# Patient Record
Sex: FEMALE | Race: WHITE | NOT HISPANIC OR LATINO | URBAN - METROPOLITAN AREA
[De-identification: names, ages, dates, MRNs, and addresses within clinical notes are randomized per-mention and may not be internally consistent; named-entity substitution may affect disease eponyms.]

---

## 2017-04-26 ENCOUNTER — APPOINTMENT (RX ONLY)
Dept: URBAN - METROPOLITAN AREA CLINIC 24 | Facility: CLINIC | Age: 42
Setting detail: DERMATOLOGY
End: 2017-04-26

## 2017-04-26 DIAGNOSIS — D22 MELANOCYTIC NEVI: ICD-10-CM

## 2017-04-26 DIAGNOSIS — L81.4 OTHER MELANIN HYPERPIGMENTATION: ICD-10-CM

## 2017-04-26 DIAGNOSIS — L82.1 OTHER SEBORRHEIC KERATOSIS: ICD-10-CM

## 2017-04-26 DIAGNOSIS — Z71.89 OTHER SPECIFIED COUNSELING: ICD-10-CM

## 2017-04-26 DIAGNOSIS — Z87.2 PERSONAL HISTORY OF DISEASES OF THE SKIN AND SUBCUTANEOUS TISSUE: ICD-10-CM

## 2017-04-26 DIAGNOSIS — L81.5 LEUKODERMA, NOT ELSEWHERE CLASSIFIED: ICD-10-CM

## 2017-04-26 PROBLEM — D22.5 MELANOCYTIC NEVI OF TRUNK: Status: ACTIVE | Noted: 2017-04-26

## 2017-04-26 PROBLEM — D22.71 MELANOCYTIC NEVI OF RIGHT LOWER LIMB, INCLUDING HIP: Status: ACTIVE | Noted: 2017-04-26

## 2017-04-26 PROBLEM — F41.9 ANXIETY DISORDER, UNSPECIFIED: Status: ACTIVE | Noted: 2017-04-26

## 2017-04-26 PROBLEM — E03.9 HYPOTHYROIDISM, UNSPECIFIED: Status: ACTIVE | Noted: 2017-04-26

## 2017-04-26 PROBLEM — J30.1 ALLERGIC RHINITIS DUE TO POLLEN: Status: ACTIVE | Noted: 2017-04-26

## 2017-04-26 PROBLEM — D22.4 MELANOCYTIC NEVI OF SCALP AND NECK: Status: ACTIVE | Noted: 2017-04-26

## 2017-04-26 PROBLEM — D22.72 MELANOCYTIC NEVI OF LEFT LOWER LIMB, INCLUDING HIP: Status: ACTIVE | Noted: 2017-04-26

## 2017-04-26 PROCEDURE — ? COUNSELING

## 2017-04-26 PROCEDURE — 99213 OFFICE O/P EST LOW 20 MIN: CPT

## 2017-04-26 ASSESSMENT — LOCATION DETAILED DESCRIPTION DERM
LOCATION DETAILED: RIGHT INFERIOR ANTERIOR NECK
LOCATION DETAILED: LEFT DISTAL DORSAL FOREARM
LOCATION DETAILED: LEFT SUPERIOR PARIETAL SCALP
LOCATION DETAILED: RIGHT PROXIMAL PRETIBIAL REGION
LOCATION DETAILED: INFERIOR MID FOREHEAD
LOCATION DETAILED: RIGHT PROXIMAL DORSAL FOREARM
LOCATION DETAILED: RIGHT SUPERIOR MEDIAL LOWER BACK
LOCATION DETAILED: LEFT PROXIMAL PRETIBIAL REGION
LOCATION DETAILED: LEFT ANTERIOR DISTAL THIGH
LOCATION DETAILED: RIGHT SUPERIOR UPPER BACK
LOCATION DETAILED: SUPERIOR THORACIC SPINE
LOCATION DETAILED: LEFT ANTERIOR PROXIMAL THIGH
LOCATION DETAILED: LEFT SUPERIOR LATERAL LOWER BACK

## 2017-04-26 ASSESSMENT — LOCATION ZONE DERM
LOCATION ZONE: LEG
LOCATION ZONE: ARM
LOCATION ZONE: SCALP
LOCATION ZONE: FACE
LOCATION ZONE: TRUNK
LOCATION ZONE: NECK

## 2017-04-26 ASSESSMENT — LOCATION SIMPLE DESCRIPTION DERM
LOCATION SIMPLE: RIGHT PRETIBIAL REGION
LOCATION SIMPLE: LEFT FOREARM
LOCATION SIMPLE: SCALP
LOCATION SIMPLE: RIGHT UPPER BACK
LOCATION SIMPLE: LEFT THIGH
LOCATION SIMPLE: LEFT LOWER BACK
LOCATION SIMPLE: UPPER BACK
LOCATION SIMPLE: RIGHT FOREARM
LOCATION SIMPLE: RIGHT LOWER BACK
LOCATION SIMPLE: INFERIOR FOREHEAD
LOCATION SIMPLE: LEFT PRETIBIAL REGION
LOCATION SIMPLE: RIGHT ANTERIOR NECK

## 2017-06-23 ENCOUNTER — HOSPITAL ENCOUNTER (OUTPATIENT)
Dept: MAMMOGRAPHY | Age: 42
Discharge: HOME | End: 2017-06-23
Attending: FAMILY MEDICINE
Payer: COMMERCIAL

## 2017-06-23 DIAGNOSIS — N63.20 LEFT BREAST MASS: ICD-10-CM

## 2017-06-23 PROCEDURE — 76642 ULTRASOUND BREAST LIMITED: CPT

## 2017-06-23 PROCEDURE — 77065 DX MAMMO INCL CAD UNI: CPT

## 2018-05-30 ENCOUNTER — HOSPITAL ENCOUNTER (OUTPATIENT)
Dept: ULTRASOUND IMAGING | Age: 43
Discharge: HOME | End: 2018-05-30
Attending: FAMILY MEDICINE
Payer: COMMERCIAL

## 2018-05-30 DIAGNOSIS — E03.9 ACQUIRED HYPOTHYROIDISM: ICD-10-CM

## 2018-05-30 PROCEDURE — 76536 US EXAM OF HEAD AND NECK: CPT

## 2018-06-18 PROBLEM — Z80.8 FAMILY HISTORY OF THYROID CANCER: Status: ACTIVE | Noted: 2018-06-18

## 2018-06-18 PROBLEM — E04.9 GOITER: Status: ACTIVE | Noted: 2018-06-18

## 2019-01-22 ENCOUNTER — HOSPITAL ENCOUNTER (OUTPATIENT)
Dept: MRI IMAGING | Age: 44
Discharge: HOME | End: 2019-01-22
Attending: FAMILY MEDICINE
Payer: COMMERCIAL

## 2019-01-22 DIAGNOSIS — R29.2 HYPERREFLEXIC: ICD-10-CM

## 2019-01-22 DIAGNOSIS — R20.2 BILATERAL LEG PARESTHESIA: ICD-10-CM

## 2019-01-22 PROCEDURE — 72148 MRI LUMBAR SPINE W/O DYE: CPT

## 2019-01-24 PROBLEM — R20.2 PARESTHESIA OF BOTH LEGS: Status: ACTIVE | Noted: 2019-01-24

## 2019-01-24 PROBLEM — R29.2 HYPERREFLEXIA: Status: ACTIVE | Noted: 2019-01-24

## 2019-01-24 PROBLEM — M79.604 PAIN IN BOTH LOWER EXTREMITIES: Status: ACTIVE | Noted: 2019-01-24

## 2019-01-24 PROBLEM — M79.605 PAIN IN BOTH LOWER EXTREMITIES: Status: ACTIVE | Noted: 2019-01-24

## 2019-01-26 ENCOUNTER — HOSPITAL ENCOUNTER (INPATIENT)
Age: 44
LOS: 5 days | Discharge: HOME | DRG: 060 | End: 2019-01-31
Attending: EMERGENCY MEDICINE | Admitting: HOSPITALIST
Payer: COMMERCIAL

## 2019-01-26 ENCOUNTER — HOSPITAL ENCOUNTER (OUTPATIENT)
Dept: MRI IMAGING | Age: 44
Discharge: HOME | DRG: 060 | End: 2019-01-26
Attending: PSYCHIATRY & NEUROLOGY
Payer: COMMERCIAL

## 2019-01-26 DIAGNOSIS — R20.2 PARESTHESIA OF BOTH LEGS: ICD-10-CM

## 2019-01-26 DIAGNOSIS — R20.0 NUMBNESS: Primary | ICD-10-CM

## 2019-01-26 DIAGNOSIS — R20.0 EXTREMITY NUMBNESS: ICD-10-CM

## 2019-01-26 PROBLEM — G35 MULTIPLE SCLEROSIS EXACERBATION (HCC): Status: ACTIVE | Noted: 2019-01-26

## 2019-01-26 LAB
ALBUMIN SERPL-MCNC: 3.9 G/DL (ref 3.5–5)
ALBUMIN/GLOB SERPL: 1.2 {RATIO}
ALP SERPL-CCNC: 62 U/L (ref 50–136)
ALT SERPL-CCNC: 20 U/L (ref 12–65)
ANION GAP SERPL CALC-SCNC: 6 MMOL/L
AST SERPL-CCNC: 13 U/L (ref 15–37)
BASOPHILS # BLD: 0 K/UL (ref 0–0.2)
BASOPHILS NFR BLD: 0 % (ref 0–2)
BILIRUB SERPL-MCNC: 0.6 MG/DL (ref 0.2–1.1)
BUN SERPL-MCNC: 10 MG/DL (ref 6–23)
CALCIUM SERPL-MCNC: 8.8 MG/DL (ref 8.3–10.4)
CHLORIDE SERPL-SCNC: 102 MMOL/L (ref 98–107)
CO2 SERPL-SCNC: 28 MMOL/L (ref 21–32)
CREAT SERPL-MCNC: 0.83 MG/DL (ref 0.6–1)
DIFFERENTIAL METHOD BLD: ABNORMAL
EOSINOPHIL # BLD: 0 K/UL (ref 0–0.8)
EOSINOPHIL NFR BLD: 0 % (ref 0.5–7.8)
ERYTHROCYTE [DISTWIDTH] IN BLOOD BY AUTOMATED COUNT: 12.5 % (ref 11.9–14.6)
GLOBULIN SER CALC-MCNC: 3.3 G/DL (ref 2.3–3.5)
GLUCOSE SERPL-MCNC: 87 MG/DL (ref 65–100)
HCT VFR BLD AUTO: 38.6 % (ref 35.8–46.3)
HGB BLD-MCNC: 12.9 G/DL (ref 11.7–15.4)
IMM GRANULOCYTES # BLD AUTO: 0 K/UL (ref 0–0.5)
IMM GRANULOCYTES NFR BLD AUTO: 0 % (ref 0–5)
LYMPHOCYTES # BLD: 2.2 K/UL (ref 0.5–4.6)
LYMPHOCYTES NFR BLD: 23 % (ref 13–44)
MAGNESIUM SERPL-MCNC: 2.2 MG/DL (ref 1.8–2.4)
MCH RBC QN AUTO: 30.7 PG (ref 26.1–32.9)
MCHC RBC AUTO-ENTMCNC: 33.4 G/DL (ref 31.4–35)
MCV RBC AUTO: 91.9 FL (ref 79.6–97.8)
MONOCYTES # BLD: 0.7 K/UL (ref 0.1–1.3)
MONOCYTES NFR BLD: 8 % (ref 4–12)
NEUTS SEG # BLD: 6.5 K/UL (ref 1.7–8.2)
NEUTS SEG NFR BLD: 68 % (ref 43–78)
NRBC # BLD: 0 K/UL (ref 0–0.2)
PHOSPHATE SERPL-MCNC: 3.3 MG/DL (ref 2.5–4.5)
PLATELET # BLD AUTO: 275 K/UL (ref 150–450)
PMV BLD AUTO: 10.4 FL (ref 9.4–12.3)
POTASSIUM SERPL-SCNC: 3.6 MMOL/L (ref 3.5–5.1)
PROT SERPL-MCNC: 7.2 G/DL
RBC # BLD AUTO: 4.2 M/UL (ref 4.05–5.2)
SODIUM SERPL-SCNC: 136 MMOL/L (ref 136–145)
T4 FREE SERPL-MCNC: 1.1 NG/DL (ref 0.9–1.8)
TSH SERPL DL<=0.005 MIU/L-ACNC: 3.88 UIU/ML
WBC # BLD AUTO: 9.5 K/UL (ref 4.3–11.1)

## 2019-01-26 PROCEDURE — 83735 ASSAY OF MAGNESIUM: CPT

## 2019-01-26 PROCEDURE — 84439 ASSAY OF FREE THYROXINE: CPT

## 2019-01-26 PROCEDURE — 74011250636 HC RX REV CODE- 250/636: Performed by: EMERGENCY MEDICINE

## 2019-01-26 PROCEDURE — 74011000302 HC RX REV CODE- 302: Performed by: HOSPITALIST

## 2019-01-26 PROCEDURE — 65270000029 HC RM PRIVATE

## 2019-01-26 PROCEDURE — 84443 ASSAY THYROID STIM HORMONE: CPT

## 2019-01-26 PROCEDURE — 80053 COMPREHEN METABOLIC PANEL: CPT

## 2019-01-26 PROCEDURE — 74011250636 HC RX REV CODE- 250/636: Performed by: PSYCHIATRY & NEUROLOGY

## 2019-01-26 PROCEDURE — 74011250637 HC RX REV CODE- 250/637: Performed by: HOSPITALIST

## 2019-01-26 PROCEDURE — 84100 ASSAY OF PHOSPHORUS: CPT

## 2019-01-26 PROCEDURE — 86580 TB INTRADERMAL TEST: CPT | Performed by: HOSPITALIST

## 2019-01-26 PROCEDURE — 72156 MRI NECK SPINE W/O & W/DYE: CPT

## 2019-01-26 PROCEDURE — 85025 COMPLETE CBC W/AUTO DIFF WBC: CPT

## 2019-01-26 PROCEDURE — 93005 ELECTROCARDIOGRAM TRACING: CPT | Performed by: EMERGENCY MEDICINE

## 2019-01-26 PROCEDURE — 99284 EMERGENCY DEPT VISIT MOD MDM: CPT | Performed by: EMERGENCY MEDICINE

## 2019-01-26 PROCEDURE — A9575 INJ GADOTERATE MEGLUMI 0.1ML: HCPCS | Performed by: PSYCHIATRY & NEUROLOGY

## 2019-01-26 RX ORDER — SODIUM CHLORIDE 0.9 % (FLUSH) 0.9 %
5-40 SYRINGE (ML) INJECTION AS NEEDED
Status: DISCONTINUED | OUTPATIENT
Start: 2019-01-26 | End: 2019-01-28 | Stop reason: HOSPADM

## 2019-01-26 RX ORDER — ONDANSETRON 2 MG/ML
4 INJECTION INTRAMUSCULAR; INTRAVENOUS
Status: DISCONTINUED | OUTPATIENT
Start: 2019-01-26 | End: 2019-01-28 | Stop reason: HOSPADM

## 2019-01-26 RX ORDER — GABAPENTIN 100 MG/1
100 CAPSULE ORAL 3 TIMES DAILY
Status: DISCONTINUED | OUTPATIENT
Start: 2019-01-26 | End: 2019-01-28 | Stop reason: HOSPADM

## 2019-01-26 RX ORDER — SODIUM CHLORIDE 0.9 % (FLUSH) 0.9 %
5-40 SYRINGE (ML) INJECTION EVERY 8 HOURS
Status: DISCONTINUED | OUTPATIENT
Start: 2019-01-26 | End: 2019-01-28 | Stop reason: HOSPADM

## 2019-01-26 RX ORDER — LORAZEPAM 1 MG/1
1 TABLET ORAL
Status: DISCONTINUED | OUTPATIENT
Start: 2019-01-26 | End: 2019-01-28 | Stop reason: HOSPADM

## 2019-01-26 RX ORDER — ACETAMINOPHEN 325 MG/1
650 TABLET ORAL
Status: DISCONTINUED | OUTPATIENT
Start: 2019-01-26 | End: 2019-01-28 | Stop reason: HOSPADM

## 2019-01-26 RX ORDER — GADOTERATE MEGLUMINE 376.9 MG/ML
14 INJECTION INTRAVENOUS
Status: COMPLETED | OUTPATIENT
Start: 2019-01-26 | End: 2019-01-26

## 2019-01-26 RX ORDER — SERTRALINE HYDROCHLORIDE 50 MG/1
50 TABLET, FILM COATED ORAL DAILY
Status: DISCONTINUED | OUTPATIENT
Start: 2019-01-27 | End: 2019-01-28 | Stop reason: HOSPADM

## 2019-01-26 RX ORDER — SODIUM CHLORIDE 0.9 % (FLUSH) 0.9 %
10 SYRINGE (ML) INJECTION
Status: COMPLETED | OUTPATIENT
Start: 2019-01-26 | End: 2019-01-26

## 2019-01-26 RX ADMIN — GADOTERATE MEGLUMINE 14 ML: 376.9 INJECTION INTRAVENOUS at 12:35

## 2019-01-26 RX ADMIN — METHYLPREDNISOLONE SODIUM SUCCINATE 500 MG: 125 INJECTION, POWDER, FOR SOLUTION INTRAMUSCULAR; INTRAVENOUS at 21:41

## 2019-01-26 RX ADMIN — Medication 10 ML: at 23:33

## 2019-01-26 RX ADMIN — TUBERCULIN PURIFIED PROTEIN DERIVATIVE 5 UNITS: 5 INJECTION, SOLUTION INTRADERMAL at 23:30

## 2019-01-26 RX ADMIN — Medication 10 ML: at 12:35

## 2019-01-26 RX ADMIN — GABAPENTIN 100 MG: 100 CAPSULE ORAL at 23:29

## 2019-01-27 ENCOUNTER — APPOINTMENT (OUTPATIENT)
Dept: MRI IMAGING | Age: 44
DRG: 060 | End: 2019-01-27
Attending: HOSPITALIST
Payer: COMMERCIAL

## 2019-01-27 LAB
ANION GAP SERPL CALC-SCNC: 7 MMOL/L
BUN SERPL-MCNC: 10 MG/DL (ref 6–23)
CALCIUM SERPL-MCNC: 8.7 MG/DL (ref 8.3–10.4)
CHLORIDE SERPL-SCNC: 104 MMOL/L (ref 98–107)
CO2 SERPL-SCNC: 27 MMOL/L (ref 21–32)
CREAT SERPL-MCNC: 0.69 MG/DL (ref 0.6–1)
CRP SERPL HS-MCNC: 1.6 MG/L
ERYTHROCYTE [SEDIMENTATION RATE] IN BLOOD: 9 MM/HR (ref 0–20)
GLUCOSE SERPL-MCNC: 151 MG/DL (ref 65–100)
POTASSIUM SERPL-SCNC: 3.6 MMOL/L (ref 3.5–5.1)
SODIUM SERPL-SCNC: 138 MMOL/L (ref 136–145)
VIT B12 SERPL-MCNC: 349 PG/ML (ref 193–986)

## 2019-01-27 PROCEDURE — 74011250637 HC RX REV CODE- 250/637: Performed by: HOSPITALIST

## 2019-01-27 PROCEDURE — 77030032490 HC SLV COMPR SCD KNE COVD -B

## 2019-01-27 PROCEDURE — 72157 MRI CHEST SPINE W/O & W/DYE: CPT

## 2019-01-27 PROCEDURE — 94760 N-INVAS EAR/PLS OXIMETRY 1: CPT

## 2019-01-27 PROCEDURE — 80048 BASIC METABOLIC PNL TOTAL CA: CPT

## 2019-01-27 PROCEDURE — 74011000258 HC RX REV CODE- 258: Performed by: HOSPITALIST

## 2019-01-27 PROCEDURE — 86256 FLUORESCENT ANTIBODY TITER: CPT

## 2019-01-27 PROCEDURE — 82607 VITAMIN B-12: CPT

## 2019-01-27 PROCEDURE — 85652 RBC SED RATE AUTOMATED: CPT

## 2019-01-27 PROCEDURE — 36415 COLL VENOUS BLD VENIPUNCTURE: CPT

## 2019-01-27 PROCEDURE — A9575 INJ GADOTERATE MEGLUMI 0.1ML: HCPCS | Performed by: HOSPITALIST

## 2019-01-27 PROCEDURE — 86141 C-REACTIVE PROTEIN HS: CPT

## 2019-01-27 PROCEDURE — 74011250636 HC RX REV CODE- 250/636: Performed by: HOSPITALIST

## 2019-01-27 PROCEDURE — 70553 MRI BRAIN STEM W/O & W/DYE: CPT

## 2019-01-27 PROCEDURE — 65270000029 HC RM PRIVATE

## 2019-01-27 RX ORDER — SODIUM CHLORIDE 0.9 % (FLUSH) 0.9 %
10 SYRINGE (ML) INJECTION
Status: COMPLETED | OUTPATIENT
Start: 2019-01-27 | End: 2019-01-27

## 2019-01-27 RX ORDER — GADOTERATE MEGLUMINE 376.9 MG/ML
14 INJECTION INTRAVENOUS
Status: COMPLETED | OUTPATIENT
Start: 2019-01-27 | End: 2019-01-27

## 2019-01-27 RX ORDER — ENOXAPARIN SODIUM 100 MG/ML
40 INJECTION SUBCUTANEOUS EVERY 24 HOURS
Status: DISCONTINUED | OUTPATIENT
Start: 2019-01-27 | End: 2019-01-28 | Stop reason: HOSPADM

## 2019-01-27 RX ADMIN — ENOXAPARIN SODIUM 40 MG: 40 INJECTION SUBCUTANEOUS at 17:39

## 2019-01-27 RX ADMIN — LORAZEPAM 1 MG: 1 TABLET ORAL at 11:25

## 2019-01-27 RX ADMIN — SERTRALINE HYDROCHLORIDE 50 MG: 50 TABLET ORAL at 09:38

## 2019-01-27 RX ADMIN — GABAPENTIN 100 MG: 100 CAPSULE ORAL at 17:39

## 2019-01-27 RX ADMIN — GABAPENTIN 100 MG: 100 CAPSULE ORAL at 22:00

## 2019-01-27 RX ADMIN — SODIUM CHLORIDE 1000 MG: 900 INJECTION, SOLUTION INTRAVENOUS at 09:37

## 2019-01-27 RX ADMIN — Medication 10 ML: at 12:16

## 2019-01-27 RX ADMIN — GADOTERATE MEGLUMINE 14 ML: 376.9 INJECTION INTRAVENOUS at 12:16

## 2019-01-27 RX ADMIN — Medication 10 ML: at 14:00

## 2019-01-27 RX ADMIN — GABAPENTIN 100 MG: 100 CAPSULE ORAL at 09:38

## 2019-01-27 NOTE — PROGRESS NOTES
Patient A/O x 4 denies any pain or nausea, patient has been up in recliner, independent ambulatory moving lower extremities with no deficits. All Neuro Vascular checked are WDL as documented, only complaint is of numbness in RLE.

## 2019-01-27 NOTE — ED TRIAGE NOTES
Pt states that she has had numbness in both legs since Tuesday. Is able to walk and has control of her bladder and bowels. Numbness is spreading.  Had a MRI today and was instructed to come to the ED

## 2019-01-27 NOTE — ROUTINE PROCESS
TRANSFER - OUT REPORT: 
 
Verbal report given to [facility] on [address]  being transferred to [facility] for routine progression of care Report consisted of patients Situation, Background, Assessment and  
Recommendations(SBAR). Information from the following report(s) SBAR was reviewed with the receiving nurse. Lines:  
Peripheral IV 01/26/19 Left Antecubital (Active) Site Assessment Clean, dry, & intact 1/26/2019  8:57 PM  
Phlebitis Assessment 0 1/26/2019  8:57 PM  
Infiltration Assessment 0 1/26/2019  8:57 PM  
  
 
Opportunity for questions and clarification was provided. Patient transported with: 
 [employer]

## 2019-01-27 NOTE — H&P
Hospitalist H&P/Consult Note Admit Date:  2019  8:19 PM  
Name:  Victorino York Age:  37 y.o. 
:  1975 MRN:  [id number] PCP:  Dexter Nicholson DO Treatment Team: Attending Provider: Erma Iyer MD 
 
HPI:  
Patient is a 36 y/o female with hx of Hashimoto's thyroiditis and family hx MS (mother) who presents to ED with suspected new onset MS flare. On Monday she first noticed shooting flashes down both legs with numbness. Saw PCP on Tuesday who ordered lumbar spine MRI to r/o pinched nerve, disc abnormality. This was negative. She was then referred to Neurology Dr. Sol and patient had normal nerve conduction studies. She was noted to have significant hyperreflexia. TSH and free T4 were normal. Now has numbness and tingling ulnar part right hand. Had MRI cervical spine today and this shows a focal area of abnormal T2 prolongation within the C5 spinal cord with the differential to include MS, focal transverse myelitis, less likely tumor or neuromyelitis optica. Patient denies acute vision problems. No motor deficits or confusion. No fever or chills. No recent viral illness. She was given 500 mg IV solumedrol in ED and Hospitalist service consulted for admission. She has not had a lumbar puncture for CSF analysis. Did have shingles right buttock in December and was treated with valtrex. 10 systems reviewed and negative except as noted in HPI. No diabetes, HTN, cardiac disease. No seizure hx. No loss of bowel, bladder continence. No tremors Past Medical History:  
Diagnosis Date  Hashimoto's thyroiditis  HPV in female 2015  
 positive on PAP  Rheumatoid arthritis ([facility] [address] No past surgical history on file. Prior to Admission Medications Prescriptions Last Dose Informant Patient Reported? Taking? LORazepam (ATIVAN) 0.5 mg tablet   No No  
Sig: Take 30 min before procedure. Repeat in 30 min if needed cholecalciferol, vitamin D3, (VITAMIN D3) 2,000 unit tab   Yes No  
Sig: Take  by mouth.  
gabapentin (NEURONTIN) 100 mg capsule   No No  
Sig: Take 1 Cap by mouth three (3) times daily. predniSONE (DELTASONE) 20 mg tablet   No No  
Sig: Take 20 mg by mouth daily (with breakfast) for 5 days. sertraline (ZOLOFT) 50 mg tablet   No No  
Sig: Take 1 Tab by mouth daily. valACYclovir (VALTREX) 1 gram tablet   No No  
Sig: Take 1 Tab by mouth three (3) times daily for 7 days. Facility-Administered Medications: None No Known Allergies Social History Tobacco Use  Smoking status: Never Smoker  Smokeless tobacco: Never Used Substance Use Topics  Alcohol use: Yes Alcohol/week: 1.8 oz Types: 3 Standard drinks or equivalent per week Comment: occ Family History Problem Relation Age of Onset  MS Mother  Diabetes Father   
     pre-diabetes  Thyroid Cancer Father  Thyroid Disease Sister   
     hypothyroidism  Liver Disease Brother   
     autoimmune hepatitis, had liver transplant  Thyroid Disease Brother   
     hypothyroidism  Thyroid Disease Sister   
     hypothyroidism  Heart Disease Brother   
     tachycardia  Breast Cancer Paternal Aunt  Heart Disease Maternal Aunt  Thyroid Disease Maternal Aunt   
     hyperthyroidism  Thyroid Disease Maternal Grandmother   
     hypothyroidism  Thyroid Disease Cousin   
     hypothyroidism Immunization History Administered Date(s) Administered  Influenza Vaccine 10/25/2013, 10/24/2018  Influenza Vaccine Elenora Gemma) 10/14/2014, 10/28/2015, 12/06/2016 Objective:  
 
Patient Vitals for the past 24 hrs: 
 Temp Pulse Resp BP SpO2  
01/26/19 1956 98.1 °F (36.7 °C) 78 16 106/69 97 % Oxygen Therapy O2 Sat (%): 97 % (01/26/19 1956) O2 Device: Room air (01/26/19 1956) No intake or output data in the 24 hours ending 01/26/19 2140 Physical Exam: 
General:    Well nourished. Alert. Eyes:   Normal sclera. Extraocular movements intact. ENT:  Normocephalic, atraumatic. Moist mucous membranes CV:   RRR. No murmur, rub, or gallop. Lungs:  CTAB. No wheezing, rhonchi, or rales. Abdomen: Soft, nontender, nondistended. Bowel sounds normal.  
Extremities: Warm and dry. No cyanosis or edema. Neurologic: CN II-XII grossly intact. Decreased sensation ulnar surface right hand Decreased sensation bilateral legs. Hyperreflexic patellar reflex Skin:     No rashes or jaundice. No wounds. Psych:  Normal mood and affect. I reviewed the labs, imaging, EKGs, telemetry, and other studies done this admission. Data Review:  
Recent Results (from the past 24 hour(s)) CBC WITH AUTOMATED DIFF Collection Time: 01/26/19  8:59 PM  
Result Value Ref Range WBC 9.5 4.3 - 11.1 K/uL  
 RBC 4.20 4.05 - 5.2 M/uL  
 HGB 12.9 11.7 - 15.4 g/dL HCT 38.6 35.8 - 46.3 % MCV 91.9 79.6 - 97.8 FL  
 MCH 30.7 26.1 - 32.9 PG  
 MCHC 33.4 31.4 - 35.0 g/dL  
 RDW 12.5 11.9 - 14.6 % PLATELET 117 555 - 677 K/uL MPV 10.4 9.4 - 12.3 FL ABSOLUTE NRBC 0.00 0.0 - 0.2 K/uL  
 DF AUTOMATED NEUTROPHILS 68 43 - 78 % LYMPHOCYTES 23 13 - 44 % MONOCYTES 8 4.0 - 12.0 % EOSINOPHILS 0 (L) 0.5 - 7.8 % BASOPHILS 0 0.0 - 2.0 % IMMATURE GRANULOCYTES 0 0.0 - 5.0 %  
 ABS. NEUTROPHILS 6.5 1.7 - 8.2 K/UL  
 ABS. LYMPHOCYTES 2.2 0.5 - 4.6 K/UL  
 ABS. MONOCYTES 0.7 0.1 - 1.3 K/UL  
 ABS. EOSINOPHILS 0.0 0.0 - 0.8 K/UL  
 ABS. BASOPHILS 0.0 0.0 - 0.2 K/UL  
 ABS. IMM. GRANS. 0.0 0.0 - 0.5 K/UL METABOLIC PANEL, COMPREHENSIVE Collection Time: 01/26/19  8:59 PM  
Result Value Ref Range Sodium 136 136 - 145 mmol/L Potassium 3.6 3.5 - 5.1 mmol/L Chloride 102 98 - 107 mmol/L  
 CO2 28 21 - 32 mmol/L Anion gap 6 mmol/L Glucose 87 65 - 100 mg/dL BUN 10 6 - 23 MG/DL Creatinine 0.83 0.6 - 1.0 MG/DL  
 GFR est AA >60 >60 ml/min/1.73m2 GFR est non-AA >60 ml/min/1.73m2 Calcium 8.8 8.3 - 10.4 MG/DL Bilirubin, total 0.6 0.2 - 1.1 MG/DL  
 ALT (SGPT) 20 12 - 65 U/L  
 AST (SGOT) 13 (L) 15 - 37 U/L Alk. phosphatase 62 50 - 136 U/L Protein, total 7.2 g/dL Albumin 3.9 3.5 - 5.0 g/dL Globulin 3.3 2.3 - 3.5 g/dL A-G Ratio 1.2 MAGNESIUM Collection Time: 01/26/19  8:59 PM  
Result Value Ref Range Magnesium 2.2 1.8 - 2.4 mg/dL PHOSPHORUS Collection Time: 01/26/19  8:59 PM  
Result Value Ref Range Phosphorus 3.3 2.5 - 4.5 MG/DL  
TSH 3RD GENERATION Collection Time: 01/26/19  8:59 PM  
Result Value Ref Range TSH 3.880 uIU/mL Imaging Studies: CXR Results  (Last 48 hours) None CT Results  (Last 48 hours) None Assessment and Plan:  
 
Hospital Problems as of 1/26/2019 Date Reviewed: 1/25/2019 Codes Class Noted - Resolved POA * (Principal) [facility] sclerosis exacerb[facility] ([facility] ICD-10-CM: G35 
ICD-9-CM: 340  1/26/2019 - Present Yes Hyperreflexia ICD-10-CM: R29.2 ICD-9-CM: 796.1  1/24/2019 - Present Yes Paresthesia of both legs ICD-10-CM: R20.2 ICD-9-CM: 782.0  1/24/2019 - Present Yes Pain in both lower extremities ICD-10-CM: M79.604, M79.605 ICD-9-CM: 729.5  1/24/2019 - Present Yes Hashimoto's thyroiditis ICD-10-CM: E06.3 ICD-9-CM: 722. 2  Unknown - Present Yes PLAN: 
· Admit inpatient to medical bed · Continue high dose IV solumedrol 1000 mg daily · Will obtain MRI brain and thoracic spine w/wout contrast 
· She has already had lumbar and cervical spine MRI 
· Prn ativan before MRI and if becomes anxious from high dose steroids · Monitor glucose while on steroids · Check sed rate, CRP, myelin assoc glycoprotein Ab, B12 
· Neurology consultation. May need LP for CSF analysis · PT evaluation. Place PPD · SCDs for DVT prophylaxis · No anticoagulants as may require lumbar puncture · Discussed with patient's spouse at bedside Estimated LOS:  2 or more midnights Signed: 
Jodie Irizarry MD

## 2019-01-27 NOTE — PROGRESS NOTES
01/26/19 2300 Dual Skin Pressure Injury Assessment Dual Skin Pressure Injury Assessment WDL Second Care Provider (Based on Facility Policy) Andria Carias RN

## 2019-01-27 NOTE — PROGRESS NOTES
Care Management Interventions Current Support Network: Lives with Spouse Plan discussed with Pt/Family/Caregiver: Yes Discharge Location Discharge Placement: Home 38 y/o F hx of Hashimoto's thyroiditis adm 1/26 with suspected new onset MS flare. Has PCP and Neurologist.  Lives with spouse and sons. No needs at this time.

## 2019-01-27 NOTE — PROGRESS NOTES
Problem: Falls - Risk of 
Goal: *Absence of Falls Document [place] Rank Fall Risk and appropriate interventions in the flowsheet. Sensory deficit due to MS Outcome: Progressing Towards Goal 
Fall Risk Interventions: 
  
 
  
 
Medication Interventions: Evaluate medications/consider consulting pharmacy

## 2019-01-27 NOTE — ED PROVIDER NOTES
15-year-old female presents with progressive worsening numbness extending from her feet to her hands over the past week. She was seen by her primary care doctor and referred to neurology, Dr. Sol. She had an unremarkable lumbar spine MRI. She had normal nerve conduction studies, but was found to be significantly hyperreflexic so a cervical spine MRI was ordered. It showed Focal area of abnormal T2 prolongation within the C5 spinal cord. Differential diagnosis include an area of demyelinization such as multiple 
sclerosis as well as focal transverse myelitis, less likely tumor, or 
neuromyelitis optica. She had worsening symptoms tonight with progression of numbness to right hand and now somewhat to her left fifth finger. She denies headache, nausea, vomiting, chest pain or shortness breath, diarrhea or weakness. She was advised by neurology to come to the hospital for admission. Her mother has multiple sclerosis. Numbness Pertinent negatives include no shortness of breath, no chest pain, no vomiting, no confusion, no headaches and no nausea. Past Medical History:  
Diagnosis Date  Hashimoto's thyroiditis  HPV in female 12/2015  
 positive on PAP  Rheumatoid arthritis ([facility] [address] No past surgical history on file. Family History:  
Problem Relation Age of Onset  MS Mother  Diabetes Father   
     pre-diabetes  Thyroid Cancer Father  Thyroid Disease Sister   
     hypothyroidism  Liver Disease Brother   
     autoimmune hepatitis, had liver transplant  Thyroid Disease Brother   
     hypothyroidism  Thyroid Disease Sister   
     hypothyroidism  Heart Disease Brother   
     tachycardia  Breast Cancer Paternal Aunt  Heart Disease Maternal Aunt  Thyroid Disease Maternal Aunt   
     hyperthyroidism  Thyroid Disease Maternal Grandmother   
     hypothyroidism  Thyroid Disease Cousin   
     hypothyroidism Social History Socioeconomic History  Marital status:  Spouse name: Dylan Victor  Number of children: 3  
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  
  Partners: Male Birth control/protection: Surgical  
Other Topics Concern  Not on file Social History Narrative Chidren: Edda Phan (12/25/98), Kostas (2/21/06), Elliot (9/23/09).  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 Review of Systems Constitutional: Negative for chills and fever. HENT: Negative for hearing loss. Eyes: Negative for visual disturbance. Respiratory: Negative for cough and shortness of breath. Cardiovascular: Negative for chest pain and palpitations. Gastrointestinal: Negative for abdominal pain, diarrhea, nausea and vomiting. Musculoskeletal: Negative for back pain. Skin: Negative for rash. Neurological: Positive for numbness. Negative for weakness and headaches. Psychiatric/Behavioral: Negative for confusion. Vitals:  
 01/26/19 1956 BP: 106/69 Pulse: 78 Resp: 16 Temp: 98.1 °F (36.7 °C) SpO2: 97% Weight: 70.3 kg (155 lb) Height: 5' 4\" (1.626 m) Physical Exam  
Constitutional: She appears well-developed and well-nourished. HENT:  
Head: Normocephalic and atraumatic.   
Right Ear: External ear normal.  
 Left Ear: External ear normal.  
Nose: Nose normal.  
Mouth/Throat: Oropharynx is clear and moist.  
Eyes: Conjunctivae are normal. Pupils are equal, round, and reactive to light. Neck: Normal range of motion. Neck supple. Cardiovascular: Regular rhythm, normal heart sounds and intact distal pulses. Pulmonary/Chest: Effort normal and breath sounds normal. No respiratory distress. She has no wheezes. Abdominal: Soft. Bowel sounds are normal. She exhibits no distension. There is no tenderness. Musculoskeletal: Normal range of motion. She exhibits no edema. Neurological: She is alert. A sensory deficit is present. Reflex Scores: 
     Bicep reflexes are 4+ on the right side and 4+ on the left side. Patellar reflexes are 4+ on the right side and 4+ on the left side. Decreased sensation diffuse bilateral legs and ulnar aspect right hand and left fifth finger. Significant hyperreflexia Skin: Skin is warm and dry. Psychiatric: Judgment normal.  
Nursing note and vitals reviewed. MDM Number of Diagnoses or Management Options Diagnosis management comments: Parts of this document were created using dragon voice recognition software. The chart has been reviewed but errors may still be present. Likely MS, but will need admission for further evaluation and high-dose IV steroids. Hospitalist paged. Amount and/or Complexity of Data Reviewed Clinical lab tests: ordered Tests in the medicine section of CPT®: ordered Review and summarize past medical records: yes Independent visualization of images, tracings, or specimens: yes Procedures

## 2019-01-27 NOTE — PROGRESS NOTES
TRANSFER - IN REPORT: 
 
Verbal report received from RAMON Steen on Morales Welch  being received from ER for routine progression of care Report consisted of patients Situation, Background, Assessment and  
Recommendations(SBAR). Information from the following report(s) SBAR, Kardex, ED Summary, Procedure Summary, Intake/Output, MAR, Recent Results and Med Rec Status was reviewed with the receiving nurse. Opportunity for questions and clarification was provided. Assessment completed upon patients arrival to unit and care assumed.

## 2019-01-27 NOTE — PROGRESS NOTES
Hospitalist Progress Note Subjective:  
Daily Progress Note: 1/27/2019 6:36 PM 
 
Patient is a 36 y/o female with hx of Hashimoto's thyroiditis and mother with MS who presents to ED with suspected new onset MS flare. On Monday she first noticed shooting flashes down both legs with numbness (right then left) with progression up to her chest and started affecting her fingers, on by one. She was then referred to Neurology Dr. Shahzad Mejia and patient had normal nerve conduction studies. She was noted to have significant hyperreflexia. TSH and free T4 were normal.  Had MRI cervical spine which showed a focal area of abnormal T2 prolongation within the C5 spinal cord with the differential to include MS, focal transverse myelitis, less likely tumor or neuromyelitis optica. Patient denies acute vision problems. No motor deficits or confusion. No fever or chills. No recent viral illness. She was given 500 mg IV solumedrol in ED and admitted. MRI brain shows single punctate focus of T2 hyperintensity in right frontal centrum ovale. Pt sitting up in bed with small child at bedside. States she feels about the same but notes numbness not progressing like it did before. She denies any new symptoms. Tolerating steroids. Current Facility-Administered Medications Medication Dose Route Frequency  methylPREDNISolone (PF) (Solu-MEDROL) 1,000 mg in 0.9% sodium chloride 100 mL IVPB  1,000 mg IntraVENous DAILY  enoxaparin (LOVENOX) injection 40 mg  40 mg SubCUTAneous Q24H  
 sodium chloride (NS) flush 5-40 mL  5-40 mL IntraVENous Q8H  
 sodium chloride (NS) flush 5-40 mL  5-40 mL IntraVENous PRN  
 gabapentin (NEURONTIN) capsule 100 mg  100 mg Oral TID  sertraline (ZOLOFT) tablet 50 mg  50 mg Oral DAILY  sodium chloride (NS) flush 5-40 mL  5-40 mL IntraVENous Q8H  
 sodium chloride (NS) flush 5-40 mL  5-40 mL IntraVENous PRN  
 tuberculin injection 5 Units  5 Units IntraDERMal ONCE  
  acetaminophen (TYLENOL) tablet 650 mg  650 mg Oral Q4H PRN  
 ondansetron (ZOFRAN) injection 4 mg  4 mg IntraVENous Q4H PRN  
 LORazepam (ATIVAN) tablet 1 mg  1 mg Oral TID PRN Review of Systems A comprehensive review of systems was negative except for that written in the HPI. Objective:  
 
Visit Vitals /71 (BP 1 Location: Right arm, BP Patient Position: Post activity) Pulse 74 Temp 98.3 °F (36.8 °C) Resp 16 Ht 5' 4\" (1.626 m) Wt 70.3 kg (155 lb) SpO2 97% Breastfeeding? No  
BMI 26.61 kg/m² O2 Device: Room air Temp (24hrs), Av.1 °F (36.7 °C), Min:98 °F (36.7 °C), Max:98.3 °F (36.8 °C) No intake/output data recorded.  1901 -  0700 In: -  
Out: 457 [[id number]] General appearance: alert, cooperative, no distress, appears stated age Lungs: clear to auscultation bilaterally Heart: regular rate and rhythm, S1, S2 normal 
Abdomen: soft, non-tender. Bowel sounds normal. No masses,  no organomegaly Extremities: extremities normal, atraumatic, no cyanosis or edema Psych: A+OX3 Additional comments:I reviewed the patient's new clinical lab test results. MRI brain Data Review Recent Results (from the past 24 hour(s)) CBC WITH AUTOMATED DIFF Collection Time: 19  8:59 PM  
Result Value Ref Range WBC 9.5 4.3 - 11.1 K/uL  
 RBC 4.20 4.05 - 5.2 M/uL  
 HGB 12.9 11.7 - 15.4 g/dL HCT 38.6 35.8 - 46.3 % MCV 91.9 79.6 - 97.8 FL  
 MCH 30.7 26.1 - 32.9 PG  
 MCHC 33.4 31.4 - 35.0 g/dL  
 RDW 12.5 11.9 - 14.6 % PLATELET 409 537 - 467 K/uL MPV 10.4 9.4 - 12.3 FL ABSOLUTE NRBC 0.00 0.0 - 0.2 K/uL  
 DF AUTOMATED NEUTROPHILS 68 43 - 78 % LYMPHOCYTES 23 13 - 44 % MONOCYTES 8 4.0 - 12.0 % EOSINOPHILS 0 (L) 0.5 - 7.8 % BASOPHILS 0 0.0 - 2.0 % IMMATURE GRANULOCYTES 0 0.0 - 5.0 %  
 ABS. NEUTROPHILS 6.5 1.7 - 8.2 K/UL  
 ABS. LYMPHOCYTES 2.2 0.5 - 4.6 K/UL  
 ABS. MONOCYTES 0.7 0.1 - 1.3 K/UL ABS. EOSINOPHILS 0.0 0.0 - 0.8 K/UL  
 ABS. BASOPHILS 0.0 0.0 - 0.2 K/UL  
 ABS. IMM. GRANS. 0.0 0.0 - 0.5 K/UL METABOLIC PANEL, COMPREHENSIVE Collection Time: 01/26/19  8:59 PM  
Result Value Ref Range Sodium 136 136 - 145 mmol/L Potassium 3.6 3.5 - 5.1 mmol/L Chloride 102 98 - 107 mmol/L  
 CO2 28 21 - 32 mmol/L Anion gap 6 mmol/L Glucose 87 65 - 100 mg/dL BUN 10 6 - 23 MG/DL Creatinine 0.83 0.6 - 1.0 MG/DL  
 GFR est AA >60 >60 ml/min/1.73m2 GFR est non-AA >60 ml/min/1.73m2 Calcium 8.8 8.3 - 10.4 MG/DL Bilirubin, total 0.6 0.2 - 1.1 MG/DL  
 ALT (SGPT) 20 12 - 65 U/L  
 AST (SGOT) 13 (L) 15 - 37 U/L Alk. phosphatase 62 50 - 136 U/L Protein, total 7.2 g/dL Albumin 3.9 3.5 - 5.0 g/dL Globulin 3.3 2.3 - 3.5 g/dL A-G Ratio 1.2 MAGNESIUM Collection Time: 01/26/19  8:59 PM  
Result Value Ref Range Magnesium 2.2 1.8 - 2.4 mg/dL PHOSPHORUS Collection Time: 01/26/19  8:59 PM  
Result Value Ref Range Phosphorus 3.3 2.5 - 4.5 MG/DL  
TSH 3RD GENERATION Collection Time: 01/26/19  8:59 PM  
Result Value Ref Range TSH 3.880 uIU/mL T4, FREE Collection Time: 01/26/19  8:59 PM  
Result Value Ref Range T4, Free 1.1 0.9 - 1.8 NG/DL  
METABOLIC PANEL, BASIC Collection Time: 01/27/19  3:18 AM  
Result Value Ref Range Sodium 138 136 - 145 mmol/L Potassium 3.6 3.5 - 5.1 mmol/L Chloride 104 98 - 107 mmol/L  
 CO2 27 21 - 32 mmol/L Anion gap 7 mmol/L Glucose 151 (H) 65 - 100 mg/dL BUN 10 6 - 23 MG/DL Creatinine 0.69 0.6 - 1.0 MG/DL  
 GFR est AA >60 >60 ml/min/1.73m2 GFR est non-AA >60 ml/min/1.73m2 Calcium 8.7 8.3 - 10.4 MG/DL  
SED RATE, AUTOMATED Collection Time: 01/27/19  3:18 AM  
Result Value Ref Range Sed rate, automated 9 0 - 20 mm/hr CRP, HIGH SENSITIVITY Collection Time: 01/27/19  3:18 AM  
Result Value Ref Range CRP, High sensitivity 1.6 mg/L MYELIN ASSOC GLYCOPROTEIN AB, IGM  
 Collection Time: 01/27/19  3:18 AM  
Result Value Ref Range Anti-MAG Ab, IgM PENDING Titer VITAMIN B12 Collection Time: 01/27/19  3:18 AM  
Result Value Ref Range Vitamin B12 349 193 - 986 pg/mL Assessment/Plan:  
 
Principal Problem: 
  Multiple sclerosis exacerbation ([address] (1/26/2019) Active Problems: 
  Hashimoto's thyroiditis () Hyperreflexia (1/24/2019) Paresthesia of both legs (1/24/2019) Pain in both lower extremities (1/24/2019) Plan: D/w tele-neuro - see consult. Recommends IV steroids for 5 days. This is pt's 2nd dose. Since she was on Prednisone (20mg) about 4 days prior to admission, she will need a taper once she completes her bolus dosings. Get LP for CSF eval and MS panel. Get DUSTIN. Recent shingles - get varicella in CSF too. Needs neurology follow up and repeat cervical MRI follow up in 6 weeks. Care Plan discussed with: Patient/Family, Nurse and Consultant . - tele neuro Dr Kirti Joy. Total time spent with patient: 20 minutes. Signed By: Jj Coombs MD   
 January 27, 2019

## 2019-01-28 ENCOUNTER — HOSPITAL ENCOUNTER (OUTPATIENT)
Dept: INTERVENTIONAL RADIOLOGY/VASCULAR | Age: 44
Discharge: HOME | End: 2019-01-28
Attending: HOSPITALIST
Payer: COMMERCIAL

## 2019-01-28 VITALS
OXYGEN SATURATION: 96 % | WEIGHT: 155 LBS | HEART RATE: 76 BPM | DIASTOLIC BLOOD PRESSURE: 55 MMHG | BODY MASS INDEX: 26.46 KG/M2 | SYSTOLIC BLOOD PRESSURE: 103 MMHG | HEIGHT: 64 IN | TEMPERATURE: 98.1 F | RESPIRATION RATE: 16 BRPM

## 2019-01-28 LAB
APPEARANCE FLD: CLEAR
COLOR FLD: COLORLESS
NUC CELL # FLD: 7 /CU MM
PROT CSF-MCNC: 34 MG/DL (ref 15–45)
RBC # FLD: 1 /CU MM
SPECIMEN SOURCE FLD: NORMAL
TUBE # CSF: 1

## 2019-01-28 PROCEDURE — 74011250637 HC RX REV CODE- 250/637: Performed by: HOSPITALIST

## 2019-01-28 PROCEDURE — 74011250637 HC RX REV CODE- 250/637: Performed by: INTERNAL MEDICINE

## 2019-01-28 PROCEDURE — 77030003666 HC NDL SPINAL BD -A

## 2019-01-28 PROCEDURE — 87798 DETECT AGENT NOS DNA AMP: CPT

## 2019-01-28 PROCEDURE — 62270 DX LMBR SPI PNXR: CPT

## 2019-01-28 PROCEDURE — 87205 SMEAR GRAM STAIN: CPT

## 2019-01-28 PROCEDURE — 84157 ASSAY OF PROTEIN OTHER: CPT

## 2019-01-28 PROCEDURE — 36415 COLL VENOUS BLD VENIPUNCTURE: CPT

## 2019-01-28 PROCEDURE — 89050 BODY FLUID CELL COUNT: CPT

## 2019-01-28 PROCEDURE — 83921 ORGANIC ACID SINGLE QUANT: CPT

## 2019-01-28 PROCEDURE — 86787 VARICELLA-ZOSTER ANTIBODY: CPT

## 2019-01-28 PROCEDURE — 86038 ANTINUCLEAR ANTIBODIES: CPT

## 2019-01-28 PROCEDURE — 82042 OTHER SOURCE ALBUMIN QUAN EA: CPT

## 2019-01-28 PROCEDURE — 94760 N-INVAS EAR/PLS OXIMETRY 1: CPT

## 2019-01-28 PROCEDURE — 65270000029 HC RM PRIVATE

## 2019-01-28 PROCEDURE — 77030014143 HC TY PUNC LUMBR BD -A

## 2019-01-28 PROCEDURE — 74011000258 HC RX REV CODE- 258: Performed by: HOSPITALIST

## 2019-01-28 PROCEDURE — 74011250636 HC RX REV CODE- 250/636: Performed by: HOSPITALIST

## 2019-01-28 PROCEDURE — 74011250636 HC RX REV CODE- 250/636: Performed by: PHYSICIAN ASSISTANT

## 2019-01-28 PROCEDURE — 83873 ASSAY OF CSF PROTEIN: CPT

## 2019-01-28 RX ORDER — LORAZEPAM 1 MG/1
1 TABLET ORAL
Status: DISCONTINUED | OUTPATIENT
Start: 2019-01-28 | End: 2019-01-31 | Stop reason: HOSPADM

## 2019-01-28 RX ORDER — GABAPENTIN 100 MG/1
100 CAPSULE ORAL 3 TIMES DAILY
Status: DISCONTINUED | OUTPATIENT
Start: 2019-01-28 | End: 2019-01-31 | Stop reason: HOSPADM

## 2019-01-28 RX ORDER — ONDANSETRON 2 MG/ML
4 INJECTION INTRAMUSCULAR; INTRAVENOUS
Status: DISCONTINUED | OUTPATIENT
Start: 2019-01-28 | End: 2019-01-31 | Stop reason: HOSPADM

## 2019-01-28 RX ORDER — LIDOCAINE HYDROCHLORIDE 20 MG/ML
8-10 INJECTION, SOLUTION EPIDURAL; INFILTRATION; INTRACAUDAL; PERINEURAL ONCE
Status: COMPLETED | OUTPATIENT
Start: 2019-01-28 | End: 2019-01-28

## 2019-01-28 RX ORDER — SERTRALINE HYDROCHLORIDE 50 MG/1
50 TABLET, FILM COATED ORAL DAILY
Status: DISCONTINUED | OUTPATIENT
Start: 2019-01-29 | End: 2019-01-31 | Stop reason: HOSPADM

## 2019-01-28 RX ORDER — ACETAMINOPHEN 325 MG/1
650 TABLET ORAL
Status: DISCONTINUED | OUTPATIENT
Start: 2019-01-28 | End: 2019-01-31 | Stop reason: HOSPADM

## 2019-01-28 RX ADMIN — GABAPENTIN 100 MG: 100 CAPSULE ORAL at 16:01

## 2019-01-28 RX ADMIN — LORAZEPAM 1 MG: 1 TABLET ORAL at 10:42

## 2019-01-28 RX ADMIN — LIDOCAINE HYDROCHLORIDE 10 ML: 20 INJECTION, SOLUTION EPIDURAL; INFILTRATION; INTRACAUDAL; PERINEURAL at 12:30

## 2019-01-28 RX ADMIN — SODIUM CHLORIDE 1000 MG: 900 INJECTION, SOLUTION INTRAVENOUS at 09:07

## 2019-01-28 NOTE — DISCHARGE INSTRUCTIONS
[address]  Department of Interventional Radiology  [facility]  [phone] Office  [phone] Fax  POST LUMBAR PUNCTURE DISCHARGE INSTRUCTIONS  General Information:  Lumbar Puncture: A LP is done to help diagnose several disorders, like pseudo tumor, migraines, meningitis, and multiple sclerosis. It involves a puncture (usually in the lower spine) into the sac that protects the spinal   Call If:   You should call your Physician and/or the Radiology Nurse if you develop a headache that is not relieved by Tylenol, and worsens when you stand and eases when you lie down, you need to call. You may have developed what is referred to as a spinal headache. Our physicians will probably advise you to be on strict bed rest for 24 hours, to drink lots of fluids and caffeine. If this does not help the head pain, call again the next day. You should call if you have bleeding other than a small spot on your bandage. You should call if you have any numbness, tingling, weakness, fever, chills, urinary retention, severe itching, rash, welts, swelling, or confusion. Follow-up Instructions: See the doctor who ordered your procedure as he/she has instructed. If you had a Lumbar Puncture or Myelogram, your results should be available to your ordering doctor in 3-5 business days. You can remove your dressing in 24 hours and shower regularly. Do not bathe or swim for 72 hours. To Reach Us: If you have any questions about your procedure, please call the Interventional Radiology department at [phone]. After business hours (5pm) and weekends, call the answering service at [phone] and ask for the Radiologist on call to be paged.      Interventional Radiology General Nurse Discharge    After general anesthesia or intravenous sedation, for 24 hours or while taking prescription Narcotics:  · Limit your activities  · Do not drive and operate hazardous machinery  · Do not make important personal or business decisions  · Do  not drink alcoholic beverages  · If you have not urinated within 8 hours after discharge, please contact your surgeon on call. * Please give a list of your current medications to your Primary Care Provider. * Please update this list whenever your medications are discontinued, doses are     changed, or new medications (including over-the-counter products) are added. * Please carry medication information at all times in case of emergency situations. These are general instructions for a healthy lifestyle:    No smoking/ No tobacco products/ Avoid exposure to second hand smoke  Surgeon General's Warning:  Quitting smoking now greatly reduces serious risk to your health. Obesity, smoking, and sedentary lifestyle greatly increases your risk for illness  A healthy diet, regular physical exercise & weight monitoring are important for maintaining a healthy lifestyle    You may be retaining fluid if you have a history of heart failure or if you experience any of the following symptoms:  Weight gain of 3 pounds or more overnight or 5 pounds in a week, increased swelling in our hands or feet or shortness of breath while lying flat in bed. Please call your doctor as soon as you notice any of these symptoms; do not wait until your next office visit. Recognize signs and symptoms of STROKE:  F-face looks uneven    A-arms unable to move or move unevenly    S-speech slurred or non-existent    T-time-call 911 as soon as signs and symptoms begin-DO NOT go       Back to bed or wait to see if you get better-TIME IS BRAIN.         Patient Signature:  Date: 1/28/2019  Discharging Nurse: Perry Dsouza RN

## 2019-01-28 NOTE — PROGRESS NOTES
Pt resting quietly in the bed,  Took shower earlier with family was in room. No c/o pain or discomfort. Pt does claim she still hs the numbness in her legs and up to her mid torso and also in her hands but not as much in her fingertips as previously. Talked to pt about talking to the IR for her CSF samples. Pt is thinking about refusing this procedure. States her Mother has had MS fir many years and never had this done.

## 2019-01-28 NOTE — PROGRESS NOTES
TRANSFER - OUT REPORT:    Verbal report given to Omaira John RN (name) on Tarun Rankin  being transferred to [address] (unit) for routine post - op       Report consisted of patients Situation, Background, Assessment and   Recommendations(SBAR). Information from the following report(s) SBAR, Kardex, Procedure Summary and MAR was reviewed with the receiving nurse. Lines:   Peripheral IV 01/26/19 Left Antecubital (Active)   Site Assessment Clean, dry, & intact 1/28/2019  8:00 AM   Phlebitis Assessment 0 1/28/2019  8:00 AM   Infiltration Assessment 0 1/28/2019  8:00 AM   Dressing Status Clean, dry, & intact 1/28/2019  8:00 AM   Dressing Type Tape;Transparent 1/28/2019  8:00 AM   Hub Color/Line Status Infusing 1/28/2019  8:00 AM        Opportunity for questions and clarification was provided.       Patient transported with:   [employer]

## 2019-01-28 NOTE — PROCEDURES
Department of Interventional Radiology  [phone]        Interventional Radiology Brief Procedure Note    Patient: Jake Lundberg MRN: [id number]  SSN: [id number]    YOB: 1975  Age: 37 y.o.   Sex: female      Date of Procedure: 1/28/2019    Pre-Procedure Diagnosis: BLE numbness, paresthesias    Post-Procedure Diagnosis: SAME    Procedure(s): Lumbar Puncture    Brief Description of Procedure: fluoro guided LP, specimen obtained, needle removed    Performed By: Violetta Brady PA-C     Assistants: None    Anesthesia:Lidocaine    Estimated Blood Loss: None    Specimens:  15.5 ml CSF to lab    Implants: none     Findings: opening pressure 19 cm J30    Complications: None    Recommendations: bedrest     Follow Up: referring MD    Signed By: Violetta Brady PA-C     January 28, 2019

## 2019-01-28 NOTE — PROGRESS NOTES
Hospitalist Progress Note Subjective:  
Daily Progress Note: 1/28/2019 11:00 AM 
 
Ms. Duke Robins is a 38 y/o female with a pmh of Hashimoto's thyroiditis and mother with MS who presents to ED with suspected new onset MS flare. On Monday she first noticed shooting flashes down both legs with numbness (right then left) with progression up to her chest and started affecting her fingers, on by one. She was then referred to Neurology (Dr. Greg Hartman) and patient had normal nerve conduction studies. She was noted to have significant hyperreflexia. TSH and free T4 were normal.  Had MRI cervical spine which showed a focal area of abnormal T2 prolongation within the C5 spinal cord with the differential to include MS, focal transverse myelitis, less likely tumor or neuromyelitis optica. Patient denies acute vision problems. No motor deficits or confusion. No fever or chills. No recent viral illness. She was given 500 mg IV solumedrol in ED and admitted. MRI brain shows single punctate focus of T2 hyperintensity in right frontal centrum ovale. She is now on day 3/5 solumedrol 1 gram daily per neurology recommendation. She states that symptoms are no longer progressing and feet feel slightly less \"heavy. \"  Plan is to go to IR DT today for LP. Questions about procedure answered to the best of my ability. Current Facility-Administered Medications Medication Dose Route Frequency  methylPREDNISolone (PF) (Solu-MEDROL) 1,000 mg in 0.9% sodium chloride 100 mL IVPB  1,000 mg IntraVENous DAILY  enoxaparin (LOVENOX) injection 40 mg  40 mg SubCUTAneous Q24H  
 sodium chloride (NS) flush 5-40 mL  5-40 mL IntraVENous Q8H  
 sodium chloride (NS) flush 5-40 mL  5-40 mL IntraVENous PRN  
 gabapentin (NEURONTIN) capsule 100 mg  100 mg Oral TID  sertraline (ZOLOFT) tablet 50 mg  50 mg Oral DAILY  sodium chloride (NS) flush 5-40 mL  5-40 mL IntraVENous Q8H  
  sodium chloride (NS) flush 5-40 mL  5-40 mL IntraVENous PRN  
 acetaminophen (TYLENOL) tablet 650 mg  650 mg Oral Q4H PRN  
 ondansetron (ZOFRAN) injection 4 mg  4 mg IntraVENous Q4H PRN  
 LORazepam (ATIVAN) tablet 1 mg  1 mg Oral TID PRN Current Outpatient Medications Medication Sig  LORazepam (ATIVAN) 0.5 mg tablet Take 30 min before procedure. Repeat in 30 min if needed  gabapentin (NEURONTIN) 100 mg capsule Take 1 Cap by mouth three (3) times daily.  sertraline (ZOLOFT) 50 mg tablet Take 1 Tab by mouth daily.  cholecalciferol, vitamin D3, (VITAMIN D3) 2,000 unit tab Take  by mouth.  valACYclovir (VALTREX) 1 gram tablet Take 1 Tab by mouth three (3) times daily for 7 days. Review of Systems A comprehensive review of systems was negative except for that written in the HPI. Objective:  
 
Visit Vitals /62 (BP 1 Location: Right arm, BP Patient Position: At rest) Pulse 74 Temp 98 °F (36.7 °C) Resp 18 Ht 5' 4\" (1.626 m) Wt 70.3 kg (155 lb) SpO2 97% Breastfeeding? No  
BMI 26.61 kg/m² O2 Device: Room air Temp (24hrs), Av.2 °F (36.8 °C), Min:98 °F (36.7 °C), Max:98.6 °F (37 °C) No intake/output data recorded.  1901 -  0700 In: -  
Out: 238 [YCTFP:712] General: awake, alert, oriented, cooperative Eyes; non icteric, RAI Neck; supple, no rigidity CV: RRR Pulm; CTAB Abd; soft, non tender Neuro: 3/5 strength of LEs, 4/5 of upper extremities, no accessory respiratory muscle use Additional comments:I reviewed the patient's new clinical lab test results. j 
 
Data Review No results found for this or any previous visit (from the past 24 hour(s)). Assessment/Plan:  
 
Principal Problem: 
  Multiple sclerosis exacerbation ([address] (2019) Active Problems: 
  Hashimoto's thyroiditis () Hyperreflexia (2019) Paresthesia of both legs (2019) Pain in both lower extremities (2019) continue solumedrol 1 gram daily, daily 3/5. Will need follow up MRI and neurology follow up in six weeks. To DT IR for LP today. To check varicella as well as she recently had shingles. B12 low normal.  Check MMA to ensure this isn't coinciding B12 deficiency. Consult PT. Care Plan discussed with: Patient/Family and Nurse Signed By: Sheri Jennings MD   
 January 28, 2019

## 2019-01-28 NOTE — PROGRESS NOTES
Pt resting in bed, call light within reach, side rails up x 3, and bed low and locked. Shift assessment complete. Pt states her bilateral lower extremities still feel numb and her first three fingers on her left hand feel numb. Pt is able to walk and move all extremities.

## 2019-01-28 NOTE — PROGRESS NOTES
PT Note: S: Per RN, pt on bedrest today to prevent spinal headache. O: Pt supine. A: No compliants.  
               P: Continue efforts tomorrow or when pt not on bedrest.

## 2019-01-29 PROBLEM — Z86.19 HISTORY OF SHINGLES: Status: ACTIVE | Noted: 2019-01-29

## 2019-01-29 LAB
ANA SER QL: NEGATIVE
VZV IGM SER IA-ACNC: 5.41 INDEX (ref 0–0.9)

## 2019-01-29 PROCEDURE — 74011000258 HC RX REV CODE- 258: Performed by: INTERNAL MEDICINE

## 2019-01-29 PROCEDURE — 97161 PT EVAL LOW COMPLEX 20 MIN: CPT

## 2019-01-29 PROCEDURE — 74011250636 HC RX REV CODE- 250/636: Performed by: INTERNAL MEDICINE

## 2019-01-29 PROCEDURE — 65270000029 HC RM PRIVATE

## 2019-01-29 PROCEDURE — 74011250637 HC RX REV CODE- 250/637: Performed by: INTERNAL MEDICINE

## 2019-01-29 PROCEDURE — 97165 OT EVAL LOW COMPLEX 30 MIN: CPT

## 2019-01-29 RX ADMIN — SODIUM CHLORIDE 1000 MG: 9 INJECTION, SOLUTION INTRAVENOUS at 09:00

## 2019-01-29 RX ADMIN — GABAPENTIN 100 MG: 100 CAPSULE ORAL at 22:06

## 2019-01-29 RX ADMIN — GABAPENTIN 100 MG: 100 CAPSULE ORAL at 16:29

## 2019-01-29 RX ADMIN — GABAPENTIN 100 MG: 100 CAPSULE ORAL at 08:26

## 2019-01-29 RX ADMIN — ACYCLOVIR SODIUM 547 MG: 50 INJECTION, SOLUTION INTRAVENOUS at 22:05

## 2019-01-29 RX ADMIN — SERTRALINE HYDROCHLORIDE 50 MG: 50 TABLET ORAL at 08:26

## 2019-01-29 NOTE — PROGRESS NOTES
Problem: Mobility Impaired (Adult and Pediatric) Goal: *Acute Goals and Plan of Care (Insert Text) No goals set. Pt independent. Outcome: Progressing Towards Goal 
 
PHYSICAL THERAPY: Initial Assessment and Discharge 1/29/2019INPATIENT:   
Payor: Juliet Valenzuela / Plan: [address] / Product Type: PPO /   
  
NAME/AGE/GENDER: Tarun Rankin is a 37 y.o. female PRIMARY DIAGNOSIS: Multiple sclerosis exacerbation ([address] Multiple sclerosis exacerbation ([facility] [address] Multiple sclerosis exacerbation ([facility] [address] ICD-10: Treatment Diagnosis: · Difficulty in walking, Not elsewhere classified (R26.2) · Other abnormalities of gait and mobility (R26.89) Precaution/Allergies: 
Patient has no known allergies. ASSESSMENT:  
Ms. Luis F Ramires presents with independence with functional mobility. Pt performed supine to sit to stand and ambulated in hallway. No skilled needs. This section established at most recent assessment PROBLEM LIST (Impairments causing functional limitations): 1. none INTERVENTIONS PLANNED: (Benefits and precautions of physical therapy have been discussed with the patient.) 1. none TREATMENT PLAN: Frequency/Duration: none Rehabilitation Potential For Stated Goals: no goals RECOMMENDED REHABILITATION/EQUIPMENT: (at time of discharge pending progress): Due to the probability of continued deficits (see above) this patient will not likely need continued skilled physical therapy after discharge. Equipment:  
? None at this time HISTORY:  
History of Present Injury/Illness (Reason for Referral): Pt with above diagnosis. Past Medical History/Comorbidities: Ms. 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 Ms. Luis F Ramires  has no past surgical history on file. Social History/Living Environment:  
Home Environment: Private residence # Steps to Enter: 3 One/Two Story Residence: Two story Living Alone: No 
Support Systems: Child(sherrill), Spouse/Significant Other/Partner, Bahai / baldomero community, Friends \ neighbors, Family member(s) Patient Expects to be Discharged to[de-identified] Private residence Current DME Used/Available at Home: None Prior Level of Function/Work/Activity: 
Independent with mobility. Number of Personal Factors/Comorbidities that affect the Plan of Care: 0: LOW COMPLEXITY EXAMINATION:  
Most Recent Physical Functioning:  
Gross Assessment: 
AROM: Generally decreased, functional 
Strength: Generally decreased, functional 
Coordination: Generally decreased, functional 
         
  
Posture: 
Posture (WDL): Within defined limits Balance: 
Sitting: Intact Standing: Intact Bed Mobility: 
Supine to Sit: Independent Wheelchair Mobility: 
  
Transfers: 
Sit to Stand: Independent Stand to Sit: Independent Gait: 
  
   
  
Body Structures Involved: 1. Bones 2. Joints 3. Muscles 4. Ligaments Body Functions Affected: 1. Neuromusculoskeletal 
2. Movement Related Activities and Participation Affected: 1. Mobility Number of elements that affect the Plan of Care: 4+: HIGH COMPLEXITY CLINICAL PRESENTATION:  
Presentation: Stable and uncomplicated: LOW COMPLEXITY CLINICAL DECISION MAKIN[address] [place] [address] [address] [id number] AM-[facility] 6 Clicks Basic Mobility Inpatient Short Form How much difficulty does the patient currently have. .. Unable A Lot A Little None 1. Turning over in bed (including adjusting bedclothes, sheets and blankets)? [] 1   [] 2   [] 3   [x] 4  
2. Sitting down on and standing up from a chair with arms ( e.g., wheelchair, bedside commode, etc.)   [] 1   [] 2   [] 3   [x] 4  
3. Moving from lying on back to sitting on the side of the bed? [] 1   [] 2   [] 3   [x] 4 How much help from another person does the patient currently need. .. Total A Lot A Little None 4. Moving to and from a bed to a chair (including a wheelchair)?    [] 1 [] 2   [] 3   [x] 4  
5. Need to walk in hospital room? [] 1   [] 2   [] 3   [x] 4  
6. Climbing 3-5 steps with a railing? [] 1   [] 2   [] 3   [x] 4  
© 2007, Trustees of [employer], under license to [employer]. All rights reserved Score:  Initial: 24 Most Recent: X (Date: -- ) Interpretation of Tool:  Represents activities that are increasingly more difficult (i.e. Bed mobility, Transfers, Gait). Medical Necessity:    
· no needs. Reason for Services/Other Comments: 
· no services indicated. Use of outcome tool(s) and clinical judgement create a POC that gives a: Clear prediction of patient's progress: LOW COMPLEXITY  
  
 
 
 
TREATMENT:  
(In addition to Assessment/Re-Assessment sessions the following treatments were rendered) Pre-treatment Symptoms/Complaints:   
Pain: Initial:  
Pain Intensity 1: 0  Post Session:  No complaints Assessment/Reassessment only, no treatment provided today Braces/Orthotics/Lines/Etc:  
· O2 Device: Room air Treatment/Session Assessment:   
· Response to Treatment:  Pt agreeable to ambulate in hallway. · Interdisciplinary Collaboration:  
o Physical Therapist 
o Occupational Therapist 
o Registered Nurse · After treatment position/precautions:  
o Supine in bed 
o Bed/Chair-wheels locked 
o Bed in low position 
o Call light within reach 
o RN notified · Compliance with Program/Exercises: no programs · Recommendations/Intent for next treatment session:  No goals. Total Treatment Duration: PT Patient Time In/Time Out Time In: 0900 Time Out: 3504 Elizabeth Mack PT

## 2019-01-29 NOTE — PROGRESS NOTES
PM assessment complete. Alert, oriented x 4. Respirations even and unlabored, no distress noted. Abdomen soft, bowel sounds active in all 4 quadrants. Denies needs or pain. Family at bedside. Aware to call should needs arise. Will continue to monitor through hourly rounding.

## 2019-01-29 NOTE — PROGRESS NOTES
Hospitalist Progress Note Patient: Mp Serrano MRN: [id number]  SSN: [id number] YOB: 1975  Age: 37 y.o. Sex: female Admit Date: 1/26/2019 LOS: 3 days Subjective:  
 
From previous notes: \"38 y/o female with a pmh of Hashimoto's thyroiditis and mother with MS who presents to ED with suspected new onset MS flare. On Monday she first noticed shooting flashes down both legs with numbness (right then left) with progression up to her chest and started affecting her fingers, on by one.  She was then referred to Neurology (Dr. Sol) and patient had normal nerve conduction studies. She was noted to have significant hyperreflexia. TSH and free T4 were normal.  Had MRI cervical spine which showed a focal area of abnormal T2 prolongation within the C5 spinal cord with the differential to include MS, focal transverse myelitis, less likely tumor or neuromyelitis optica. Patient denies acute vision problems. No motor deficits or confusion. No fever or chills. No recent viral illness. She was given 500 mg IV solumedrol in ED and admitted.  MRI brain shows single punctate focus of T2 hyperintensity in right frontal centrum ovale. \" 
 
1/29 - She states that her LE heaviness and parasthesias are improved today. Denies F/C/N/V. Denies vision changes. Review of systems negative except stated above. Objective:  
 
Visit Vitals BP 94/62 Pulse 71 Temp 98 °F (36.7 °C) Resp 18 Ht 5' 4\" (1.626 m) Wt 70.3 kg (155 lb) SpO2 96% Breastfeeding? No  
BMI 26.61 kg/m² Oxygen Therapy O2 Sat (%): 96 % (01/29/19 1500) Pulse via Oximetry: 70 beats per minute (01/28/19 1544) O2 Device: Room air (01/28/19 1544) Intake and Output:  
 
Intake/Output Summary (Last 24 hours) at 1/29/2019 1847 Last data filed at 1/29/2019 0800 Gross per 24 hour Intake 600 ml Output  Net 600 ml Physical Exam:  
GENERAL: alert, cooperative, no distress, appears stated age EYE: conjunctivae/corneas clear. PERRL. THROAT & NECK: normal and no erythema or exudates noted. LUNG: clear to auscultation bilaterally HEART: regular rate and rhythm, S1S2, no murmur, no JVD ABDOMEN: soft, non-tender, non-distended. Bowel sounds normal.  
EXTREMITIES:  No edema, 2+ pedal/radial pulses bilaterally SKIN: no rash or abnormalities NEUROLOGIC: A&Ox3. Cranial nerves 2-12 grossly intact. Lab/Data Review: No results found for this or any previous visit (from the past 24 hour(s)). Imaging: 
Ir Spinal Puncture Lumbar Diagnostic Result Date: 1/28/2019 Impression: Uncomplicated lumbar puncture. Plan: The patient will recover at bedrest.  
 
No results found for this visit on 01/26/19. Cultures: All Micro Results Procedure Component Value Units Date/Time CULTURE, CSF Yakelin White [[id number]] Collected:  01/28/19 1058 Order Status:  Completed Specimen:  Cerebrospinal Fluid Updated:  01/29/19 3457 Special Requests: NO SPECIAL REQUESTS     
  GRAM STAIN NO WBCS SEEN     
   NO DEFINITE ORGANISM SEEN Culture result: NO GROWTH 1 DAY     
 CSF HOLD [[id number]] Order Status:  Canceled Specimen:  Cerebrospinal Fluid CSF HOLD [[id number]] Order Status:  Canceled Specimen:  Cerebrospinal Fluid CULTURE, CSF Tom English STAIN [[id number]] Order Status:  Canceled Specimen:  Cerebrospinal Fluid Assessment/Plan:  
 
Principal Problem: 
  Multiple sclerosis exacerbation ([facility] [address] (1/26/2019) - Seen on MRI 
- Continue high dose Solumedrol - Await final CSF studies Active Problems: 
  Hashimoto's thyroiditis () 
- No current medications Hyperreflexia (1/24/2019) Paresthesia of both legs (1/24/2019) - Likely due to #1 
- Improving with Solumedrol Pain in both lower extremities (1/24/2019) - Likely due to #1 
- Improving with Solumedrol History of shingles (1/29/2019) - Had shingles in early 12/2018 
- VZV Ab positive in CSF --> ?meaning Dispo - Discharge tomorrow after Solumedrol DIET REGULAR 
 
DVT Prophylaxis: SCDs/walking Signed By: Chantal Francisco DO   
 January 29, 2019

## 2019-01-29 NOTE — PROGRESS NOTES
Pt resting in bed, call light within reach, side rails up x 3, and bed low and locked. Shift assessment complete. Pt states her numbness has improved from yesterday. No needs at this time.

## 2019-01-29 NOTE — PROGRESS NOTES
Problem: Self Care Deficits Care Plan (Adult) Goal: *Acute Goals and Plan of Care (Insert Text) OCCUPATIONAL THERAPY: Initial Assessment, Discharge and AM 1/29/2019INPATIENT:   
Payor: David Dc / Plan: Jenn Brown [id number] / Product Type: PPO /  
  
NAME/AGE/GENDER: Malka Winslow is a 37 y.o. female PRIMARY DIAGNOSIS:  Multiple sclerosis exacerbation ([address] Multiple sclerosis exacerbation ([address] Multiple sclerosis exacerbation ([address] ICD-10: Treatment Diagnosis:  
 · Generalized Muscle Weakness (M62.81) Precautions/Allergies: 
   Patient has no known allergies. ASSESSMENT:  
Ms. Chloe Barcenas admitted with above diagnosis; pt presents with numbness in LEs, trunk and right hand. Pt independent with self care and functional mobility. No skilled OT indicated at this time. Will discharge OT. This section established at most recent assessment PROBLEM LIST (Impairments causing functional limitations INTERVENTIONS PLANNED: (Benefits and precautions of occupational therapy have been discussed with the patient.) TREATMENT PLAN: Frequency/Duration: discharge OTRehabilitation Potential For Stated Goals: discharge OT  
 
RECOMMENDED REHABILITATION/EQUIPMENT: (at time of discharge pending progress): Due to the probability of continued deficits (see above) this patient will not likely need continued skilled occupational therapy after discharge. Equipment:  
? None at this time OCCUPATIONAL PROFILE AND HISTORY:  
History of Present Injury/Illness (Reason for Referral): 
47-year-old female presents with progressive worsening numbness extending from her feet to her hands over the past week. She was seen by her primary care doctor and referred to neurology, Dr. Sol. She had an unremarkable lumbar spine MRI.   She had normal nerve conduction studies, but was found to be significantly hyperreflexic so a cervical spine MRI was ordered. It showed Focal area of abnormal T2 prolongation within the C5 spinal cord. Differential diagnosis include an area of demyelinization such as multiple 
sclerosis as well as focal transverse myelitis, less likely tumor, or 
neuromyelitis optica. She had worsening symptoms tonight with progression of numbness to right hand and now somewhat to her left fifth finger. She denies headache, nausea, vomiting, chest pain or shortness breath, diarrhea or weakness. She was advised by neurology to come to the hospital for admission. Her mother has multiple sclerosis. Past Medical History/Comorbidities: Ms. 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 Ms. Alexa Pereira  has no past surgical history on file. Social History/Living Environment:  
Home Environment: Private residence # Steps to Enter: 3 One/Two Story Residence: Two story Living Alone: No 
Support Systems: Child(sherrill), Spouse/Significant Other/Partner, [employer] / baldomero community, Friends \ neighbors, Family member(s) Patient Expects to be Discharged to[de-identified] Private residence Current DME Used/Available at Home: None Prior Level of Function/Work/Activity: 
independent Number of Personal Factors/Comorbidities that affect the Plan of Care: Brief history (0):  LOW COMPLEXITY ASSESSMENT OF OCCUPATIONAL PERFORMANCE[de-identified]  
Activities of Daily Living:  
Basic ADLs (From Assessment) Complex ADLs (From Assessment) Feeding: Independent Oral Facial Hygiene/Grooming: Independent Bathing: Independent Upper Body Dressing: Independent Lower Body Dressing: Independent Toileting: Independent Grooming/Bathing/Dressing Activities of Daily Living Cognitive Retraining Safety/Judgement: Awareness of environment Functional Transfers Bathroom Mobility: Independent Toilet Transfer : Independent Shower Transfer: Independent Bed/Mat Mobility Supine to Sit: Independent Sit to Supine: Independent Sit to Stand: Independent Bed to Chair: Independent Most Recent Physical Functioning:  
Gross Assessment: 
AROM: Within functional limits(BUE) Strength: Generally decreased, functional(BUE) Coordination: Generally decreased, functional(BUE) Tone: Normal 
Sensation: Impaired(Right hand) Posture: 
Posture (WDL): Within defined limits Balance: 
Sitting: Intact Standing: Intact Bed Mobility: 
Supine to Sit: Independent Sit to Supine: Independent Wheelchair Mobility: 
  
Transfers: 
Sit to Stand: Independent Stand to Sit: Independent Bed to Chair: Independent Patient Vitals for the past 6 hrs: 
 BP SpO2 Pulse 01/29/19 0700 107/70 97 % 66 Mental Status Neurologic State: Alert Orientation Level: Oriented X4 Cognition: Follows commands Perception: Appears intact Perseveration: No perseveration noted Safety/Judgement: Awareness of environment Physical Skills Involved: 
1. Balance 2. Strength 3. Activity Tolerance Cognitive Skills Affected (resulting in the inability to perform in a timely and safe manner): 1. none Psychosocial Skills Affected: 1. none Number of elements that affect the Plan of Care: 1-3:  LOW COMPLEXITY CLINICAL DECISION MAKING:  
[facility] [place] [facility] -[facility] 6 Clicks Daily Activity Inpatient Short Form How much help from another person does the patient currently need. .. Total A Lot A Little None 1. Putting on and taking off regular lower body clothing? [] 1   [] 2   [] 3   [x] 4  
2. Bathing (including washing, rinsing, drying)? [] 1   [] 2   [] 3   [x] 4  
3. Toileting, which includes using toilet, bedpan or urinal?   [] 1   [] 2   [] 3   [x] 4  
4. Putting on and taking off regular upper body clothing? [] 1   [] 2   [] 3   [x] 4  
5. Taking care of personal grooming such as brushing teeth? [] 1   [] 2   [] 3   [x] 4 6.  Eating meals? [] 1   [] 2   [] 3   [x] 4  
© 2007, Trustees of [address] [place] [address] [address] [address], under license to [employer]. All rights reserved Score:  Initial: 24 Most Recent: X (Date: -- ) Interpretation of Tool:  Represents activities that are increasingly more difficult (i.e. Bed mobility, Transfers, Gait). Medical Necessity: · Discharge OT Reason for Services/Other Comments: · Discharge OT Use of outcome tool(s) and clinical judgement create a POC that gives a: LOW COMPLEXITY  
 
 
 
TREATMENT:  
(In addition to Assessment/Re-Assessment sessions the following treatments were rendered) Pre-treatment Symptoms/Complaints: Tolerated ambulation Pain: Initial:  
Pain Intensity 1: 0  Post Session:  0 Assessment/Reassessment only, no treatment provided today Braces/Orthotics/Lines/Etc:  
· IV 
· O2 Device: Room air Treatment/Session Assessment:   
· Response to Treatment:  Tolerated well · Interdisciplinary Collaboration:  
o Physical Therapist 
o Registered Nurse · After treatment position/precautions:  
o Supine in bed 
o Bed/Chair-wheels locked 
o Bed in low position 
o Call light within reach 
o RN notified 
o Side rails x 3  
· Compliance with Program/Exercises: discharge OT. · Recommendations/Intent for next treatment session:  Discharge OT. Total Treatment Duration: OT Patient Time In/Time Out Time In: 2242 Time Out: 0930 Morgan Reid OT

## 2019-01-30 LAB
ALB CSF/SERPL: 3 {RATIO} (ref 0–8)
ALBUMIN CSF-MCNC: 12 MG/DL (ref 11–48)
ALBUMIN SERPL-MCNC: 3.9 G/DL (ref 3.5–5.5)
IGG CSF-MCNC: 2.3 MG/DL (ref 0–8.6)
IGG SERPL-MCNC: 986 MG/DL (ref 700–1600)
IGG SYNTH RATE SER+CSF CALC-MRATE: 0.8 MG/DAY
IGG/ALB CLEAR SER+CSF-RTO: 0.8 (ref 0–0.7)
IGG/ALB CSF: 0.19 {RATIO} (ref 0–0.25)
OLIGOCLONAL BANDS.IT SER+CSF QL: ABNORMAL

## 2019-01-30 PROCEDURE — 65270000029 HC RM PRIVATE

## 2019-01-30 PROCEDURE — 74011000258 HC RX REV CODE- 258: Performed by: INTERNAL MEDICINE

## 2019-01-30 PROCEDURE — 74011250636 HC RX REV CODE- 250/636: Performed by: INTERNAL MEDICINE

## 2019-01-30 PROCEDURE — 36415 COLL VENOUS BLD VENIPUNCTURE: CPT

## 2019-01-30 PROCEDURE — 74011250637 HC RX REV CODE- 250/637: Performed by: INTERNAL MEDICINE

## 2019-01-30 PROCEDURE — 87799 DETECT AGENT NOS DNA QUANT: CPT

## 2019-01-30 PROCEDURE — 87389 HIV-1 AG W/HIV-1&-2 AB AG IA: CPT

## 2019-01-30 RX ADMIN — SERTRALINE HYDROCHLORIDE 50 MG: 50 TABLET ORAL at 08:46

## 2019-01-30 RX ADMIN — ACYCLOVIR SODIUM 547 MG: 50 INJECTION, SOLUTION INTRAVENOUS at 15:57

## 2019-01-30 RX ADMIN — ACYCLOVIR SODIUM 547 MG: 50 INJECTION, SOLUTION INTRAVENOUS at 07:19

## 2019-01-30 RX ADMIN — GABAPENTIN 100 MG: 100 CAPSULE ORAL at 22:51

## 2019-01-30 RX ADMIN — SODIUM CHLORIDE 1000 MG: 9 INJECTION, SOLUTION INTRAVENOUS at 09:03

## 2019-01-30 RX ADMIN — GABAPENTIN 100 MG: 100 CAPSULE ORAL at 15:57

## 2019-01-30 RX ADMIN — ACYCLOVIR SODIUM 547 MG: 50 INJECTION, SOLUTION INTRAVENOUS at 22:51

## 2019-01-30 RX ADMIN — GABAPENTIN 100 MG: 100 CAPSULE ORAL at 08:46

## 2019-01-30 NOTE — CONSULTS
Infectious Disease Consult    Today's Date: 1/30/2019   Admit Date: 1/26/2019    Impression:   · MS exacerbation  · Hashimoto's Thyroiditis  · Bilateral LE parathesia  · Recent hx Shingles (12/2018)    Plan:   ·  Await CSF VZV PCR  · Add EBV and CMV PCR to CSF studies  · Check HIV  · Continue AVC for now  · Recent hx Shingles (12/2018); right buttock    Anti-infectives:   · ACV (1/29-    Subjective:   Date of Consultation:  January 30, 2019  Referring Physician: Dr. Carl Terrazas    Patient is a 37 y.o. female From previous notes: \"36 y/o female with a pmh of Hashimoto's thyroiditis and mother with MS who presents to ED with suspected new onset MS flare. On Monday she first noticed shooting flashes down both legs with numbness (right then left) with progression up to her chest and started affecting her fingers, on by one.  She was then referred to Neurology (Dr. Larios) and patient had normal nerve conduction studies. She was noted to have significant hyperreflexia. TSH and free T4 were normal.  Had MRI cervical spine which showed a focal area of abnormal T2 prolongation within the C5 spinal cord with the differential to include MS, focal transverse myelitis, less likely tumor or neuromyelitis optica. Patient denies acute vision problems. No motor deficits or confusion. No fever or chills. No recent viral illness. She was given 500 mg IV solumedrol in ED and admitted.  MRI brain shows single punctate focus of T2 hyperintensity in right frontal centrum ovale. \"  On 1/29/19 - She states that her LE heaviness and parasthesias were improved. Denied F/C/N/V. Denied vision changes. Today she reports continued improvement. Denies CP/SOB. Parasthesias present but improved. LP completed 1/28/19 with WBC 7, and RBC 1. Protein 34. Culture NGTD. ID is asked to evaluate patient for treatment and diagnostic recommendations.          Patient Active Problem List   Diagnosis Code    Hashimoto's thyroiditis E06.3    Goiter E04.9  Family history of thyroid cancer Z80.8    Hyperreflexia R29.2    Paresthesia of both legs R20.2    Pain in both lower extremities M79.604, M79.605    Multiple sclerosis exacerbation ([facility] G35    History of shingles Z86.19     Past Medical History:   Diagnosis Date    Hashimoto's thyroiditis     HPV in female 12/2015    positive on PAP    Rheumatoid arthritis ([facility] [address]       Family History   Problem Relation Age of Onset   [facility] MS Mother     Diabetes Father         pre-diabetes    Thyroid Cancer Father     Thyroid Disease Sister         hypothyroidism    Liver Disease Brother         autoimmune hepatitis, had liver transplant    Thyroid Disease Brother         hypothyroidism    Thyroid Disease Sister         hypothyroidism    Heart Disease Brother         tachycardia    Breast Cancer Paternal Aunt     Heart Disease Maternal Aunt     Thyroid Disease Maternal Aunt         hyperthyroidism    Thyroid Disease Maternal Grandmother         hypothyroidism    Thyroid Disease Cousin         hypothyroidism      Social History     Tobacco Use    Smoking status: Never Smoker    Smokeless tobacco: Never Used   Substance Use Topics    Alcohol use: Yes     Alcohol/week: 1.8 oz     Types: 3 Standard drinks or equivalent per week     Comment: occ     No past surgical history on file. Prior to Admission medications    Medication Sig Start Date End Date Taking? Authorizing Provider   LORazepam (ATIVAN) 0.5 mg tablet Take 30 min before procedure. Repeat in 30 min if needed 1/22/19  Yes Magalys Ross, DO   gabapentin (NEURONTIN) 100 mg capsule Take 1 Cap by mouth three (3) times daily. 1/22/19  Yes Magalys Ross DO   sertraline (ZOLOFT) 50 mg tablet Take 1 Tab by mouth daily. 12/11/18  Yes Syeda Harrington MD   cholecalciferol, vitamin D3, (VITAMIN D3) 2,000 unit tab Take  by mouth. Yes Provider, Historical   valACYclovir (VALTREX) 1 gram tablet Take 1 Tab by mouth three (3) times daily for 7 days.  1/23/19 19  Lena James EUBANKS, DO       No Known Allergies     Review of Systems:  A comprehensive review of systems was negative except for that written in the History of Present Illness. Objective:     Visit Vitals  /60   Pulse 72   Temp 97.7 °F (36.5 °C)   Resp 20   Ht 5' 4\" (1.626 m)   Wt 70.3 kg (155 lb)   SpO2 98%   Breastfeeding? No   BMI 26.61 kg/m²     Temp (24hrs), Av.9 °F (36.6 °C), Min:97.7 °F (36.5 °C), Max:98 °F (36.7 °C)       Lines:  Peripheral IV:       Physical Exam:    General:  Alert, cooperative, well noursished, well developed, appears stated age   Eyes:  Sclera anicteric. Pupils equally round and reactive to light. Mouth/Throat: Mucous membranes normal, oral pharynx clear   Neck: Supple   Lungs:   Clear to auscultation bilaterally, good effort   CV:  Regular rate and rhythm,no murmur, click, rub or gallop   Abdomen:   Soft, non-tender. bowel sounds normal. non-distended   Extremities: No cyanosis or edema   Skin: Skin color, texture, turgor normal. no acute rash or lesions   Lymph nodes: Cervical and supraclavicular normal   Musculoskeletal: No swelling or deformity   Lines/Devices:  Intact, no erythema, drainage or tenderness   Psych: Alert and oriented, normal mood affect given the setting       Data Review:     CBC:  No results for input(s): WBC, GRANS, MONOS, EOS, ANEU, ABL, HGB, HCT, PLT, HGBEXT, HCTEXT, PLTEXT in the last 72 hours. No lab exists for component: LYMPHS,  GIANNA    BMP:  No results for input(s): CREA, BUN, NA, K, CL, CO2, AGAP, GLU in the last 72 hours. LFTS:  No results for input(s): TBILI, ALT, SGOT, AP, TP, ALB in the last 72 hours.     Microbiology:     All Micro Results     Procedure Component Value Units Date/Time    CULTURE, CSF August Jada [[id number]] Collected:  19 1058    Order Status:  Completed Specimen:  Cerebrospinal Fluid Updated:  19 1010     Special Requests: NO SPECIAL REQUESTS        GRAM STAIN NO WBCS SEEN         NO DEFINITE ORGANISM SEEN        Culture result: NO GROWTH 2 DAYS       CSF HOLD [[id number]]     Order Status:  Canceled Specimen:  Cerebrospinal Fluid     CSF HOLD [[id number]]     Order Status:  Canceled Specimen:  Cerebrospinal Fluid     CULTURE, CSF Brooks Drum STAIN [[id number]]     Order Status:  Canceled Specimen:  Cerebrospinal Fluid           Imagin/26 MRI brain  IMPRESSION  Impression:  1. Single punctate focus of T2 hyperintensity within the right frontal lobe  centrum semiovale. This is a nonspecific finding but can be seen in \"normal\"  subjects as well as those of migraine headaches. Remote ischemic change could  also have this appearance as could a small focus of demyelination although  somewhat atypical.  2.  Partially visualized cord signal abnormality at the level of C5. C-spine:  IMPRESSION  Impression:Focal area of abnormal T2 prolongation within the C5 spinal cord. Differential diagnosis include an area of demyelinization such as multiple  sclerosis as well as focal transverse myelitis, less likely tumor, or  neuromyelitis optica. T-spine:  IMPRESSION: Unremarkable MRI of the thoracic spine. L-spine  IMPRESSION:   1. No abnormal marrow signal changes to suggest acute osseous abnormality. .  Only mild STIR signal is seen about left-sided articular facets at L4-5 which is  felt to be degenerative.     2. Mild disc herniations seen at multiple levels as described above. No central  canal stenosis is seen.  However, mild to moderate neural foraminal stenosis is  seen at L3-4, and L4-5 as described above.     Signed By: Tyson Rodriguez NP     2019

## 2019-01-30 NOTE — PROGRESS NOTES
Hospitalist Progress Note Patient: Cornelius Lan MRN: [id number]  SSN: [id number] YOB: 1975  Age: 37 y.o. Sex: female Admit Date: 1/26/2019 LOS: 4 days Subjective:  
 
From previous notes: \"36 y/o female with a pmh of Hashimoto's thyroiditis and mother with MS who presents to ED with suspected new onset MS flare. On Monday she first noticed shooting flashes down both legs with numbness (right then left) with progression up to her chest and started affecting her fingers, on by one.  She was then referred to Neurology (Dr. Sol) and patient had normal nerve conduction studies. She was noted to have significant hyperreflexia. TSH and free T4 were normal.  Had MRI cervical spine which showed a focal area of abnormal T2 prolongation within the C5 spinal cord with the differential to include MS, focal transverse myelitis, less likely tumor or neuromyelitis optica. Patient denies acute vision problems. No motor deficits or confusion. No fever or chills. No recent viral illness. She was given 500 mg IV solumedrol in ED and admitted.  MRI brain shows single punctate focus of T2 hyperintensity in right frontal centrum ovale. \" 
 
1/29 - She states that her LE heaviness and parasthesias are improved today. Denies F/C/N/V. Denies vision changes. 1/30 - She continues to improved. Denies CP/SOB. Parasthesias present but improved. Review of systems negative except stated above. Objective:  
 
Visit Vitals /72 Pulse 69 Temp 98 °F (36.7 °C) Resp 20 Ht 5' 4\" (1.626 m) Wt 70.3 kg (155 lb) SpO2 97% Breastfeeding? No  
BMI 26.61 kg/m² Oxygen Therapy O2 Sat (%): 97 % (01/30/19 0710) Pulse via Oximetry: 70 beats per minute (01/28/19 1544) O2 Device: Room air (01/28/19 0936) Intake and Output:  
No intake or output data in the 24 hours ending 01/30/19 0985 Physical Exam:  
GENERAL: alert, cooperative, no distress, appears stated age EYE: conjunctivae/corneas clear. PERRL. THROAT & NECK: normal and no erythema or exudates noted. LUNG: clear to auscultation bilaterally HEART: regular rate and rhythm, S1S2, no murmur, no JVD ABDOMEN: soft, non-tender, non-distended. Bowel sounds normal.  
EXTREMITIES:  No edema, 2+ pedal/radial pulses bilaterally SKIN: no rash or abnormalities NEUROLOGIC: A&Ox3. Cranial nerves 2-12 grossly intact. Lab/Data Review: No results found for this or any previous visit (from the past 24 hour(s)). Imaging: 
Ir Spinal Puncture Lumbar Diagnostic Result Date: 1/28/2019 Impression: Uncomplicated lumbar puncture. Plan: The patient will recover at bedrest.  
 
No results found for this visit on 01/26/19. Cultures: All Micro Results Procedure Component Value Units Date/Time CULTURE, CSF Nirav Martínez [[id number]] Collected:  01/28/19 1058 Order Status:  Completed Specimen:  Cerebrospinal Fluid Updated:  01/29/19 6291 Special Requests: NO SPECIAL REQUESTS     
  GRAM STAIN NO WBCS SEEN     
   NO DEFINITE ORGANISM SEEN Culture result: NO GROWTH 1 DAY     
 CSF HOLD [[id number]] Order Status:  Canceled Specimen:  Cerebrospinal Fluid CSF HOLD [[id number]] Order Status:  Canceled Specimen:  Cerebrospinal Fluid CULTURE, CSF Evelena Aniceto STAIN [[id number]] Order Status:  Canceled Specimen:  Cerebrospinal Fluid Assessment/Plan:  
 
Principal Problem: 
  Multiple sclerosis exacerbation ([address] (1/26/2019) - Seen on MRI 
- Continue high dose Solumedrol today - VZV Ab abnormal --> sent PCR on 1/29 
- ID consulted - Await final CSF studies Active Problems: 
  Hashimoto's thyroiditis () 
- No current medications Hyperreflexia (1/24/2019) Paresthesia of both legs (1/24/2019) - Likely due to #1 
- Improving with Solumedrol Pain in both lower extremities (1/24/2019) - Likely due to #1 
- Improving with Solumedrol History of shingles (1/29/2019) - Had shingles in early 12/2018 
- VZV Ab positive in CSF --> sent PCR on 1/29 
- ID consulted Dispo - ID consulted. CSF VZV PCR pending. DIET REGULAR 
 
DVT Prophylaxis: SCDs/walking Signed By: Pankaj Graves,    
 January 30, 2019

## 2019-01-30 NOTE — PROGRESS NOTES
Pt restign quietly in the bed,  No verbalized c/o's  Still has the numbness in her extremities, but she states it is better than before. Pt alert and oriented and ambulates well in the room. Reminded pt to call for needs.

## 2019-01-30 NOTE — PROGRESS NOTES
Problem: Falls - Risk of 
Goal: *Absence of Falls Document Nicky Fearing Fall Risk and appropriate interventions in the flowsheet. Sensory deficit due to MS Outcome: Progressing Towards Goal 
Fall Risk Interventions: 
  
 
  
 
Medication Interventions: Evaluate medications/consider consulting pharmacy

## 2019-01-30 NOTE — PROGRESS NOTES
[facility] leaking at insertion site, removed old IV. Place new # 20 in left Hand, flushed returned blood, flushed and reconnected IVF.

## 2019-01-31 VITALS
HEART RATE: 71 BPM | SYSTOLIC BLOOD PRESSURE: 117 MMHG | HEIGHT: 64 IN | RESPIRATION RATE: 20 BRPM | BODY MASS INDEX: 26.46 KG/M2 | TEMPERATURE: 98.4 F | WEIGHT: 155 LBS | OXYGEN SATURATION: 97 % | DIASTOLIC BLOOD PRESSURE: 65 MMHG

## 2019-01-31 LAB
HIV 1+2 AB+HIV1 P24 AG SERPL QL IA: NON REACTIVE
Lab: NORMAL
METHYLMALONATE SERPL-SCNC: 105 NMOL/L (ref 0–378)

## 2019-01-31 PROCEDURE — 74011250637 HC RX REV CODE- 250/637: Performed by: INTERNAL MEDICINE

## 2019-01-31 PROCEDURE — 74011636637 HC RX REV CODE- 636/637: Performed by: INTERNAL MEDICINE

## 2019-01-31 PROCEDURE — 74011250636 HC RX REV CODE- 250/636: Performed by: INTERNAL MEDICINE

## 2019-01-31 PROCEDURE — 74011000258 HC RX REV CODE- 258: Performed by: INTERNAL MEDICINE

## 2019-01-31 RX ORDER — PREDNISONE 20 MG/1
TABLET ORAL
Qty: 11 TAB | Refills: 0 | Status: SHIPPED | OUTPATIENT
Start: 2019-01-31 | End: 2019-02-28

## 2019-01-31 RX ORDER — PREDNISONE 20 MG/1
40 TABLET ORAL
Status: DISCONTINUED | OUTPATIENT
Start: 2019-01-31 | End: 2019-01-31 | Stop reason: HOSPADM

## 2019-01-31 RX ORDER — VALACYCLOVIR HYDROCHLORIDE 1 G/1
1000 TABLET, FILM COATED ORAL 3 TIMES DAILY
Qty: 42 TAB | Refills: 0 | Status: SHIPPED | OUTPATIENT
Start: 2019-01-31 | End: 2019-02-14

## 2019-01-31 RX ADMIN — GABAPENTIN 100 MG: 100 CAPSULE ORAL at 08:48

## 2019-01-31 RX ADMIN — SERTRALINE HYDROCHLORIDE 50 MG: 50 TABLET ORAL at 08:48

## 2019-01-31 RX ADMIN — ACYCLOVIR SODIUM 547 MG: 50 INJECTION, SOLUTION INTRAVENOUS at 05:39

## 2019-01-31 RX ADMIN — PREDNISONE 40 MG: 20 TABLET ORAL at 09:32

## 2019-01-31 NOTE — PROGRESS NOTES
Slept at intervals during shift. No further c/o voiced. No change in  status noted. Call light within reach.

## 2019-01-31 NOTE — PROGRESS NOTES
Discharge instructions reviewed with patient, opportunity for questions given. Pt able to leave once ride arrives.

## 2019-01-31 NOTE — DISCHARGE INSTRUCTIONS
DISCHARGE SUMMARY from Nurse    PATIENT INSTRUCTIONS:    After general anesthesia or intravenous sedation, for 24 hours or while taking prescription Narcotics:  · Limit your activities  · Do not drive and operate hazardous machinery  · Do not make important personal or business decisions  · Do  not drink alcoholic beverages  · If you have not urinated within 8 hours after discharge, please contact your surgeon on call. Report the following to your surgeon:  · Excessive pain, swelling, redness or odor of or around the surgical area  · Temperature over 100.5  · Nausea and vomiting lasting longer than 4 hours or if unable to take medications  · Any signs of decreased circulation or nerve impairment to extremity: change in color, persistent  numbness, tingling, coldness or increase pain  · Any questions    What to do at Home:  *  Please give a list of your current medications to your Primary Care Provider. *  Please update this list whenever your medications are discontinued, doses are      changed, or new medications (including over-the-counter products) are added. *  Please carry medication information at all times in case of emergency situations. These are general instructions for a healthy lifestyle:    No smoking/ No tobacco products/ Avoid exposure to second hand smoke  Surgeon General's Warning:  Quitting smoking now greatly reduces serious risk to your health. Obesity, smoking, and sedentary lifestyle greatly increases your risk for illness    A healthy diet, regular physical exercise & weight monitoring are important for maintaining a healthy lifestyle    You may be retaining fluid if you have a history of heart failure or if you experience any of the following symptoms:  Weight gain of 3 pounds or more overnight or 5 pounds in a week, increased swelling in our hands or feet or shortness of breath while lying flat in bed.   Please call your doctor as soon as you notice any of these symptoms; do not wait until your next office visit. Recognize signs and symptoms of STROKE:    F-face looks uneven    A-arms unable to move or move unevenly    S-speech slurred or non-existent    T-time-call 911 as soon as signs and symptoms begin-DO NOT go       Back to bed or wait to see if you get better-TIME IS BRAIN. Warning Signs of HEART ATTACK     Call 911 if you have these symptoms:   Chest discomfort. Most heart attacks involve discomfort in the center of the chest that lasts more than a few minutes, or that goes away and comes back. It can feel like uncomfortable pressure, squeezing, fullness, or pain.  Discomfort in other areas of the upper body. Symptoms can include pain or discomfort in one or both arms, the back, neck, jaw, or stomach.  Shortness of breath with or without chest discomfort.  Other signs may include breaking out in a cold sweat, nausea, or lightheadedness. Don't wait more than five minutes to call 911 - MINUTES MATTER! Fast action can save your life. Calling 911 is almost always the fastest way to get lifesaving treatment. Emergency Medical Services staff can begin treatment when they arrive -- up to an hour sooner than if someone gets to the hospital by car. The discharge information has been reviewed with the {PATIENT PARENT GUARDIAN:40153}. The {PATIENT PARENT GUARDIAN:14100} verbalized understanding. Discharge medications reviewed with the {Dishcarge meds reviewed ZPIT:83843} and appropriate educational materials and side effects teaching were provided.   ___________________________________________________________________________________________________________________________________

## 2019-01-31 NOTE — PROGRESS NOTES
Problem: Falls - Risk of 
Goal: *Absence of Falls Document Michelle Dawson Fall Risk and appropriate interventions in the flowsheet. Sensory deficit due to MS Outcome: Progressing Towards Goal 
Fall Risk Interventions: 
  
 
  
 
Medication Interventions: Evaluate medications/consider consulting pharmacy

## 2019-01-31 NOTE — DISCHARGE SUMMARY
Hospitalist Discharge Summary     Patient ID:  Yo Acosta  [id number]  4 y.o.  1975  Admit date: 1/26/2019  8:19 PM  Discharge date and time: 1/31/2019  Attending: No att. providers found  PCP:  Fidel Perry DO  Treatment Team: Utilization Review: Arnaud Smith RN; Care Manager: Babak Rodríguez LMSW; Utilization Review: Ryan Batista RN; Consulting Provider: Crista Yanez MD    Principal Diagnosis Multiple sclerosis exacerbation [facility])    Hospital Problems as of 1/31/2019 Date Reviewed: 1/28/2019          Codes Class Noted - Resolved POA    History of shingles ICD-10-CM: Z86.19  ICD-9-CM: V12.09  1/29/2019 - Present Yes        * (Principal) Multiple sclerosis exacerbation ([facility] [address] ICD-10-CM: G35  ICD-9-CM: 340  1/26/2019 - Present Yes        Hyperreflexia ICD-10-CM: R29.2  ICD-9-CM: 796.1  1/24/2019 - Present Yes        Paresthesia of both legs ICD-10-CM: R20.2  ICD-9-CM: 782.0  1/24/2019 - Present Yes        Pain in both lower extremities ICD-10-CM: M79.604, M79.605  ICD-9-CM: 729.5  1/24/2019 - Present Yes        Hashimoto's thyroiditis ICD-10-CM: E06.3  ICD-9-CM: 903. 2  Unknown - Present Yes                  Hospital Course:  38 y/o female with a pmh of Hashimoto's thyroiditis and mother with MS who presented to ED with suspected new onset MS flare. On Monday she first noticed shooting flashes down both legs with numbness (right then left) with progression up to her chest and started affecting her fingers, on by one.  She was then referred to Neurology (Dr. Larios) and patient had normal nerve conduction studies. She was noted to have significant hyperreflexia. TSH and free T4 were normal.  Had MRI cervical spine which showed a focal area of abnormal T2 prolongation within the C5 spinal cord with the differential to include MS, focal transverse myelitis, less likely tumor or neuromyelitis optica. Patient denied acute vision problems. No motor deficits or confusion.  No fever or chills. No recent viral illness. She was given 500 mg IV solumedrol in ED and admitted for 5 days of Solumedrol 1g. MRI brain shows single punctate focus of T2 hyperintensity in right frontal centrum ovale. She had an LP done which showed a high VZV Ab so ID was consulted and started her on Acyclovir. A VZV PCR was ordered. She continue to improve and was discharged home with 14 days of Valtrex and a Prednisone taper. Significant Diagnostic Studies:    Labs: Results:       Chemistry No results for input(s): GLU, NA, K, CL, CO2, BUN, CREA, CA, AGAP, BUCR, TBIL, GPT, AP, TP, ALB, GLOB, AGRAT in the last 72 hours. CBC w/Diff No results for input(s): WBC, RBC, HGB, HCT, PLT, GRANS, LYMPH, EOS, HGBEXT, HCTEXT, PLTEXT in the last 72 hours. Cardiac Enzymes No results for input(s): CPK, CKND1, PALMIRA in the last 72 hours. No lab exists for component: CKRMB, TROIP   Coagulation No results for input(s): PTP, INR, APTT in the last 72 hours. No lab exists for component: INREXT    Lipid Panel Lab Results   Component Value Date/Time    Cholesterol, total 157 05/21/2018 10:05 AM    HDL Cholesterol 69 05/21/2018 10:05 AM    LDL, calculated 80 05/21/2018 10:05 AM    VLDL, calculated 8 05/21/2018 10:05 AM    Triglyceride 39 05/21/2018 10:05 AM      BNP No results for input(s): BNPP in the last 72 hours. Liver Enzymes No results for input(s): TP, ALB, TBIL, AP, SGOT, GPT in the last 72 hours. No lab exists for component: DBIL   Thyroid Studies Lab Results   Component Value Date/Time    TSH 3.880 01/26/2019 08:59 PM            Imaging:  Ir Spinal Puncture Lumbar Diagnostic    Result Date: 1/28/2019  Impression: Uncomplicated lumbar puncture. Plan: The patient will recover at bedrest.       Microbiology/Cultures:   All Micro Results     Procedure Component Value Units Date/Time    CULTURE, CSF Sherie Woody [[id number]] Collected:  01/28/19 1058    Order Status:  Completed Specimen:  Cerebrospinal Fluid Updated: 01/31/19 0813     Special Requests: NO SPECIAL REQUESTS        GRAM STAIN NO WBCS SEEN         NO DEFINITE ORGANISM SEEN        Culture result: NO GROWTH 3 DAYS       CMV BY PCR, QT [[id number]] Collected:  01/30/19 1546    Order Status:  Completed Specimen:  Blood Updated:  01/30/19 1602    CMV BY PCR, QT, BAL [[id number]] Collected:  01/30/19 1551    Order Status:  Canceled     CSF HOLD [[id number]]     Order Status:  Canceled Specimen:  Cerebrospinal Fluid     CSF HOLD [[id number]]     Order Status:  Canceled Specimen:  Cerebrospinal Fluid     CULTURE, CSF Vera Beech STAIN [[id number]]     Order Status:  Canceled Specimen:  Cerebrospinal Fluid           Discharge Exam:  Visit Vitals  /65   Pulse 71   Temp 98.4 °F (36.9 °C)   Resp 20   Ht 5' 4\" (1.626 m)   Wt 70.3 kg (155 lb)   SpO2 97%   Breastfeeding? No   BMI 26.61 kg/m²     General appearance: alert, cooperative, no distress, appears stated age  Lungs: clear to auscultation bilaterally  Heart: regular rate and rhythm, S1, S2 normal, no murmur, click, rub or gallop  Abdomen: soft, non-tender. Bowel sounds normal. No masses,  no organomegaly  Extremities: no cyanosis or edema  Neurologic: Grossly normal    Disposition: home  Discharge Condition: stable  Patient Instructions:   Discharge Medication List as of 1/31/2019 12:28 PM      CONTINUE these medications which have CHANGED    Details   predniSONE (DELTASONE) 20 mg tablet Take 2 tabs with breakfast x 2 days then 1 tab with breakfast x 3 days then 1/2 tab x 4 days, Normal, Disp-11 Tab, R-0      valACYclovir (VALTREX) 1 gram tablet Take 1 Tab by mouth three (3) times daily for 14 days. , Normal, Disp-42 Tab, R-0         CONTINUE these medications which have NOT CHANGED    Details   LORazepam (ATIVAN) 0.5 mg tablet Take 30 min before procedure. Repeat in 30 min if needed, Print, Disp-2 Tab, R-0      gabapentin (NEURONTIN) 100 mg capsule Take 1 Cap by mouth three (3) times daily. , Normal, Disp-60 Cap, R-0 sertraline (ZOLOFT) 50 mg tablet Take 1 Tab by mouth daily. , Print, Disp-90 Tab, R-1      cholecalciferol, vitamin D3, (VITAMIN D3) 2,000 unit tab Take  by mouth., Historical Med             Activity: Activity as tolerated  Diet: Regular Diet  Wound Care: None needed    Follow-up  ·   Dr. Raman Bean in one week  · Dr. Sol in 1-2 weeks  Time spent to discharge patient 35 minutes  Signed:  Hank Murray DO  1/31/2019  2:04 PM

## 2019-02-01 LAB
EBV PCR QT, WB, EBVPQ1: NEGATIVE COPIES/ML
EBV, LOG 10 COPY/ML, EBVPQ2: NORMAL LOG10COPY/ML
MAG AB, IGM, ANMGLT: NORMAL TITER

## 2019-02-02 LAB
BACTERIA SPEC CULT: NORMAL
GRAM STN SPEC: NORMAL
GRAM STN SPEC: NORMAL
SERVICE CMNT-IMP: NORMAL

## 2019-02-03 LAB
CMV DNA SERPL NAA+PROBE-ACNC: NEGATIVE IU/ML
CMV DNA SERPL NAA+PROBE-LOG IU: NORMAL LOG10 IU/ML

## 2019-02-05 LAB
SPECIMEN SOURCE: NORMAL
VZV DNA SPEC QL NAA+PROBE: NEGATIVE

## 2019-02-06 LAB — MBP CSF-MCNC: >167 NG/ML (ref 0–1.2)

## 2019-02-13 PROBLEM — B02.24: Status: ACTIVE | Noted: 2019-01-26

## 2019-02-21 ENCOUNTER — HOSPITAL ENCOUNTER (OUTPATIENT)
Dept: MRI IMAGING | Age: 44
Discharge: HOME | DRG: 099 | End: 2019-02-21
Attending: PSYCHIATRY & NEUROLOGY
Payer: COMMERCIAL

## 2019-02-21 DIAGNOSIS — G04.91 MYELITIS (HCC): ICD-10-CM

## 2019-02-21 PROCEDURE — A9575 INJ GADOTERATE MEGLUMI 0.1ML: HCPCS | Performed by: PSYCHIATRY & NEUROLOGY

## 2019-02-21 PROCEDURE — 74011250636 HC RX REV CODE- 250/636: Performed by: PSYCHIATRY & NEUROLOGY

## 2019-02-21 PROCEDURE — 72156 MRI NECK SPINE W/O & W/DYE: CPT

## 2019-02-21 RX ORDER — GADOTERATE MEGLUMINE 376.9 MG/ML
15 INJECTION INTRAVENOUS
Status: COMPLETED | OUTPATIENT
Start: 2019-02-21 | End: 2019-02-21

## 2019-02-21 RX ORDER — SODIUM CHLORIDE 0.9 % (FLUSH) 0.9 %
10 SYRINGE (ML) INJECTION
Status: COMPLETED | OUTPATIENT
Start: 2019-02-21 | End: 2019-02-21

## 2019-02-21 RX ADMIN — GADOTERATE MEGLUMINE 15 ML: 376.9 INJECTION INTRAVENOUS at 12:39

## 2019-02-21 RX ADMIN — Medication 10 ML: at 12:39

## 2019-02-23 ENCOUNTER — HOSPITAL ENCOUNTER (INPATIENT)
Age: 44
LOS: 5 days | Discharge: HOME | DRG: 099 | End: 2019-02-28
Attending: INTERNAL MEDICINE | Admitting: INTERNAL MEDICINE
Payer: COMMERCIAL

## 2019-02-23 DIAGNOSIS — F40.240 CLAUSTROPHOBIA: ICD-10-CM

## 2019-02-23 DIAGNOSIS — G04.91 MYELITIS (HCC): Primary | ICD-10-CM

## 2019-02-23 DIAGNOSIS — Z92.89 HISTORY OF PLASMAPHERESIS: ICD-10-CM

## 2019-02-23 DIAGNOSIS — Z86.19 HISTORY OF SHINGLES: ICD-10-CM

## 2019-02-23 DIAGNOSIS — R20.2 PARESTHESIA OF BOTH LEGS: ICD-10-CM

## 2019-02-23 DIAGNOSIS — B02.29 POSTHERPETIC NEURALGIA: ICD-10-CM

## 2019-02-23 LAB
ALBUMIN SERPL-MCNC: 3.4 G/DL (ref 3.5–5)
ALBUMIN/GLOB SERPL: 1.1 {RATIO} (ref 1.2–3.5)
ALP SERPL-CCNC: 55 U/L (ref 50–136)
ALT SERPL-CCNC: 28 U/L (ref 12–65)
ANION GAP SERPL CALC-SCNC: 9 MMOL/L (ref 7–16)
APPEARANCE FLD: CLEAR
AST SERPL-CCNC: 16 U/L (ref 15–37)
BASOPHILS # BLD: 0 K/UL (ref 0–0.2)
BASOPHILS NFR BLD: 1 % (ref 0–2)
BILIRUB SERPL-MCNC: 0.6 MG/DL (ref 0.2–1.1)
BUN SERPL-MCNC: 13 MG/DL (ref 6–23)
CALCIUM SERPL-MCNC: 8.5 MG/DL (ref 8.3–10.4)
CHLORIDE SERPL-SCNC: 106 MMOL/L (ref 98–107)
CO2 SERPL-SCNC: 25 MMOL/L (ref 21–32)
COLOR FLD: COLORLESS
CREAT SERPL-MCNC: 0.62 MG/DL (ref 0.6–1)
DIFFERENTIAL METHOD BLD: NORMAL
EOSINOPHIL # BLD: 0.1 K/UL (ref 0–0.8)
EOSINOPHIL NFR BLD: 1 % (ref 0.5–7.8)
ERYTHROCYTE [DISTWIDTH] IN BLOOD BY AUTOMATED COUNT: 13 % (ref 11.9–14.6)
GLOBULIN SER CALC-MCNC: 3 G/DL (ref 2.3–3.5)
GLUCOSE CSF-MCNC: 51 MG/DL (ref 40–70)
GLUCOSE SERPL-MCNC: 106 MG/DL (ref 65–100)
HCT VFR BLD AUTO: 38.2 % (ref 35.8–46.3)
HGB BLD-MCNC: 12.6 G/DL (ref 11.7–15.4)
HIV1 P24 AG SERPL QL IA: NONREACTIVE
HIV1+2 AB SERPL QL IA: NONREACTIVE
IMM GRANULOCYTES # BLD AUTO: 0 K/UL (ref 0–0.5)
IMM GRANULOCYTES NFR BLD AUTO: 0 % (ref 0–5)
LYMPHOCYTES # BLD: 1.7 K/UL (ref 0.5–4.6)
LYMPHOCYTES NFR BLD: 33 % (ref 13–44)
MCH RBC QN AUTO: 31 PG (ref 26.1–32.9)
MCHC RBC AUTO-ENTMCNC: 33 G/DL (ref 31.4–35)
MCV RBC AUTO: 94.1 FL (ref 79.6–97.8)
MONOCYTES # BLD: 0.6 K/UL (ref 0.1–1.3)
MONOCYTES NFR BLD: 12 % (ref 4–12)
NEUTS SEG # BLD: 2.7 K/UL (ref 1.7–8.2)
NEUTS SEG NFR BLD: 53 % (ref 43–78)
NRBC # BLD: 0 K/UL (ref 0–0.2)
NUC CELL # FLD: 1 /CU MM
PLATELET # BLD AUTO: 258 K/UL (ref 150–450)
PMV BLD AUTO: 10.6 FL (ref 9.4–12.3)
POTASSIUM SERPL-SCNC: 3.3 MMOL/L (ref 3.5–5.1)
PROT CSF-MCNC: 29 MG/DL (ref 15–45)
PROT SERPL-MCNC: 6.4 G/DL (ref 6.3–8.2)
RBC # BLD AUTO: 4.06 M/UL (ref 4.05–5.2)
RBC # FLD: 2 /CU MM
SODIUM SERPL-SCNC: 140 MMOL/L (ref 136–145)
SPECIMEN SOURCE FLD: NORMAL
TUBE # CSF: 1
TUBE # CSF: 3
WBC # BLD AUTO: 5 K/UL (ref 4.3–11.1)

## 2019-02-23 PROCEDURE — 62272 THER SPI PNXR DRG CSF: CPT | Performed by: PSYCHIATRY & NEUROLOGY

## 2019-02-23 PROCEDURE — 36415 COLL VENOUS BLD VENIPUNCTURE: CPT

## 2019-02-23 PROCEDURE — 74011250636 HC RX REV CODE- 250/636: Performed by: PSYCHIATRY & NEUROLOGY

## 2019-02-23 PROCEDURE — 74011250636 HC RX REV CODE- 250/636: Performed by: INTERNAL MEDICINE

## 2019-02-23 PROCEDURE — 87798 DETECT AGENT NOS DNA AMP: CPT

## 2019-02-23 PROCEDURE — 82945 GLUCOSE OTHER FLUID: CPT

## 2019-02-23 PROCEDURE — 009U3ZX DRAINAGE OF SPINAL CANAL, PERCUTANEOUS APPROACH, DIAGNOSTIC: ICD-10-PCS | Performed by: PSYCHIATRY & NEUROLOGY

## 2019-02-23 PROCEDURE — 87483 CNS DNA AMP PROBE TYPE 12-25: CPT

## 2019-02-23 PROCEDURE — 89050 BODY FLUID CELL COUNT: CPT

## 2019-02-23 PROCEDURE — 82787 IGG 1 2 3 OR 4 EACH: CPT

## 2019-02-23 PROCEDURE — 87799 DETECT AGENT NOS DNA QUANT: CPT

## 2019-02-23 PROCEDURE — 87205 SMEAR GRAM STAIN: CPT

## 2019-02-23 PROCEDURE — 74011250637 HC RX REV CODE- 250/637: Performed by: INTERNAL MEDICINE

## 2019-02-23 PROCEDURE — 74011000258 HC RX REV CODE- 258: Performed by: INTERNAL MEDICINE

## 2019-02-23 PROCEDURE — 85025 COMPLETE CBC W/AUTO DIFF WBC: CPT

## 2019-02-23 PROCEDURE — 87389 HIV-1 AG W/HIV-1&-2 AB AG IA: CPT

## 2019-02-23 PROCEDURE — 87530 HSV DNA QUANT: CPT

## 2019-02-23 PROCEDURE — 80053 COMPREHEN METABOLIC PANEL: CPT

## 2019-02-23 PROCEDURE — 83520 IMMUNOASSAY QUANT NOS NONAB: CPT

## 2019-02-23 PROCEDURE — 65270000029 HC RM PRIVATE

## 2019-02-23 PROCEDURE — 84157 ASSAY OF PROTEIN OTHER: CPT

## 2019-02-23 PROCEDURE — 99223 1ST HOSP IP/OBS HIGH 75: CPT | Performed by: PSYCHIATRY & NEUROLOGY

## 2019-02-23 RX ORDER — SERTRALINE HYDROCHLORIDE 50 MG/1
50 TABLET, FILM COATED ORAL DAILY
Status: DISCONTINUED | OUTPATIENT
Start: 2019-02-23 | End: 2019-02-28 | Stop reason: HOSPADM

## 2019-02-23 RX ORDER — LIDOCAINE HYDROCHLORIDE 10 MG/ML
10 INJECTION INFILTRATION; PERINEURAL ONCE
Status: COMPLETED | OUTPATIENT
Start: 2019-02-23 | End: 2019-02-23

## 2019-02-23 RX ORDER — CYANOCOBALAMIN 1000 UG/ML
1000 INJECTION, SOLUTION INTRAMUSCULAR; SUBCUTANEOUS ONCE
Status: COMPLETED | OUTPATIENT
Start: 2019-02-23 | End: 2019-02-23

## 2019-02-23 RX ORDER — SODIUM CHLORIDE 0.9 % (FLUSH) 0.9 %
5-40 SYRINGE (ML) INJECTION AS NEEDED
Status: DISCONTINUED | OUTPATIENT
Start: 2019-02-23 | End: 2019-02-28 | Stop reason: HOSPADM

## 2019-02-23 RX ORDER — LORAZEPAM 0.5 MG/1
0.5 TABLET ORAL ONCE
Status: COMPLETED | OUTPATIENT
Start: 2019-02-23 | End: 2019-02-23

## 2019-02-23 RX ORDER — GABAPENTIN 100 MG/1
100 CAPSULE ORAL 3 TIMES DAILY
Status: DISCONTINUED | OUTPATIENT
Start: 2019-02-23 | End: 2019-02-28 | Stop reason: HOSPADM

## 2019-02-23 RX ORDER — SODIUM CHLORIDE 0.9 % (FLUSH) 0.9 %
5-40 SYRINGE (ML) INJECTION EVERY 8 HOURS
Status: DISCONTINUED | OUTPATIENT
Start: 2019-02-23 | End: 2019-02-28 | Stop reason: HOSPADM

## 2019-02-23 RX ADMIN — GABAPENTIN 100 MG: 100 CAPSULE ORAL at 22:40

## 2019-02-23 RX ADMIN — CYANOCOBALAMIN 1000 MCG: 1000 INJECTION, SOLUTION INTRAMUSCULAR; SUBCUTANEOUS at 11:56

## 2019-02-23 RX ADMIN — SODIUM CHLORIDE 500 MG: 900 INJECTION, SOLUTION INTRAVENOUS at 13:10

## 2019-02-23 RX ADMIN — SODIUM CHLORIDE 500 MG: 900 INJECTION, SOLUTION INTRAVENOUS at 22:40

## 2019-02-23 RX ADMIN — Medication 10 ML: at 10:00

## 2019-02-23 RX ADMIN — Medication 10 ML: at 22:41

## 2019-02-23 RX ADMIN — ACYCLOVIR SODIUM 550 MG: 50 INJECTION, SOLUTION INTRAVENOUS at 17:42

## 2019-02-23 RX ADMIN — LIDOCAINE HYDROCHLORIDE 10 ML: 10 INJECTION, SOLUTION INFILTRATION; PERINEURAL at 11:57

## 2019-02-23 RX ADMIN — GABAPENTIN 100 MG: 100 CAPSULE ORAL at 17:42

## 2019-02-23 RX ADMIN — Medication 10 ML: at 13:46

## 2019-02-23 RX ADMIN — ACYCLOVIR SODIUM 550 MG: 50 INJECTION, SOLUTION INTRAVENOUS at 11:56

## 2019-02-23 RX ADMIN — GABAPENTIN 100 MG: 100 CAPSULE ORAL at 11:56

## 2019-02-23 RX ADMIN — LORAZEPAM 0.5 MG: 0.5 TABLET ORAL at 10:30

## 2019-02-23 NOTE — H&P
Hospitalist H&P Note     Admit Date:  2019  8:50 AM   Name:  Morales Welch   Age:  37 y.o.  :  1975   MRN:  [id number]   PCP:  Sophie Roe MD  Treatment Team: Attending Provider: Gilbert Lu MD    HPI:     CC:  Paresthesia     Ms. Devorah Pedraza is a 36 yo female with PMH of varicella zoster, hashimotos thyroiditis and admit  for what was felt to be a new multiple sclerosis flare. She began with complaints of LE paresthesia. She had outpatient neurology workup with normal NCV. MRI cspine showed focal T2 abnormality at C5. MRI brain showed right frontal centrum ovale lesion. She had LP that showed elevated VZV Ab and was managed with acyclovir. She was also managed with IV steroids. She was discharged home with outpatient neurology followup and began to have recurrent LE and right UE paresthesia. She was re-evaluated by neurology with followup MRI cspine showing focal C5 lesion. She has been referred to direct admit for concern for ongoing myelitis. She denies new vision changes, falls or ataxia. She feels generally uncomfortable in her lower back. No sam pain. Normal appetite. 10 systems reviewed and negative except as noted in HPI. - some arthritis       Past Medical History:   Diagnosis Date    Hashimoto's thyroiditis     HPV in female 2015    positive on PAP    Multiple sclerosis ([facility] [address] 2019    Rheumatoid arthritis (HCC)       No PSH     No Known Allergies   Social History     Tobacco Use    Smoking status: Never Smoker    Smokeless tobacco: Never Used   Substance Use Topics    Alcohol use:  Yes     Alcohol/week: 1.8 oz     Types: 3 Standard drinks or equivalent per week     Comment: occ      Family History   Problem Relation Age of Onset   Floril Jose MS Mother     Diabetes Father         pre-diabetes    Thyroid Cancer Father     Thyroid Disease Sister         hypothyroidism    Liver Disease Brother         autoimmune hepatitis, had liver transplant    Thyroid Disease Brother         hypothyroidism    Thyroid Disease Sister         hypothyroidism    Heart Disease Brother         tachycardia    Breast Cancer Paternal Aunt     Heart Disease Maternal Aunt     Thyroid Disease Maternal Aunt         hyperthyroidism    Thyroid Disease Maternal Grandmother         hypothyroidism    Thyroid Disease Cousin         hypothyroidism      Immunization History   Administered Date(s) Administered    Influenza Vaccine 10/25/2013, 10/24/2018    Influenza Vaccine (Quad) 10/14/2014, 10/28/2015, 12/06/2016    TB Skin Test (PPD) Intradermal 01/26/2019     PTA Medications:  Prior to Admission Medications   Prescriptions Last Dose Informant Patient Reported? Taking? LORazepam (ATIVAN) 0.5 mg tablet   No No   Sig: Take 30 min before procedure. Repeat in 30 min if needed   cholecalciferol, vitamin D3, (VITAMIN D3) 2,000 unit tab   Yes No   Sig: Take  by mouth.   gabapentin (NEURONTIN) 100 mg capsule   No No   Sig: Take 1 Cap by mouth three (3) times daily. predniSONE (DELTASONE) 20 mg tablet   No No   Sig: Take 2 tabs with breakfast x 2 days then 1 tab with breakfast x 3 days then 1/2 tab x 4 days   sertraline (ZOLOFT) 50 mg tablet   No No   Sig: Take 1 Tab by mouth daily. Facility-Administered Medications: None       Objective:     Patient Vitals for the past 24 hrs:   Temp Pulse Resp BP SpO2   02/23/19 0924 98 °F (36.7 °C) 75 18 112/77 97 %     Oxygen Therapy  O2 Sat (%): 97 % (02/23/19 0924)  No intake or output data in the 24 hours ending 02/23/19 0931    Physical Exam:  General:    Alert. No distress  Eyes:   Normal sclera. Extraocular movements intact. PERRLA  ENT:  Normocephalic, atraumatic. Moist mucous membranes  CV:   RRR. Us David No edema  Lungs:  CTAB. No wheezing, rhonchi, or rales. Abdomen: Soft, nontender, nondistended. Present BS  Extremities: Warm and dry. .  Neurologic: Diffusely hyper-reflexic, 5/5 extremity strength  Skin:     No rashes or jaundice. Normal coloration  Psych:  Normal mood and affect. I reviewed the labs, imaging, EKGs, telemetry, and other studies done this admission. Data Review:   No results found for this or any previous visit (from the past 24 hour(s)). All Micro Results     Procedure Component Value Units Date/Time    TUBE 2 - CULTURE WITH Nupur Davidson, CSF [[id number]]     Order Status:  Sent Specimen:  Cerebrospinal Fluid     TUBE 1 - HOLD CSF [[id number]]     Order Status:  Sent Specimen:  Cerebrospinal Fluid     CMV BY PCR, QT, BAL [[id number]]     Order Status:  Sent     MENINGITIS PATHOGENS PANEL (BY PCR), CSF [[id number]]     Order Status:  Sent Specimen:  Cerebrospinal Fluid     HSV 1 AND 2 PCR [[id number]]     Order Status:  Sent Specimen:  Other           Other Studies:  No results found. Assessment and Plan:     Hospital Problems as of 2/23/2019 Date Reviewed: 1/28/2019          Codes Class Noted - Resolved POA    * (Principal) [facility]el[facility] ([facility] ICD-10-CM: G04.91  ICD-9-CM: 323.9  2/23/2019 - Present Yes        History of shingles ICD-10-CM: Z86.19  ICD-9-CM: V12.09  1/29/2019 - Present Yes              · Myelitis: admit to medical bed, discussed with Dr. Axel Walker of neurology and [facility], will need LP (to send cell count and diff, meningitis panel, HSV/EBV/CMV/VZV PCR and serum IgG subclass and HIV) then starting solumedrol 500 mg IV every 12 hours and IV acyclovir.  contniue home neurontin and zoloft    Discharge planning:  Pending course to home  DVT ppx: SCD  Code status:  Full  Estimated LOS:  Greater than 2 midnights  Risk:  high  Care plan:  Jan, [phone]  Signed:  Evelin Osullivan MD

## 2019-02-23 NOTE — CONSULTS
Infectious Disease Consult    Today's Date: 2/23/2019   Admit Date: 2/23/2019    Impression:   · Cervical myelitis  · Recent cutaneous zoster    I have doubt that the cervical spine lesion is the result of active zoster myelitis. There is no CSF pleocytosis and VZV was not amplified from CSF. I do think that the timing with recent cutaneous zoster is very suspicious. Plan:   · continue acyclovir for now  · Continue corticosteroids  · Send CSF for repeat testing for VZV PCR    Anti-infectives:   · Acyclovir 550 mg IV q 8 hours    Subjective:   Date of Consultation:  February 23, 2019  Referring Physician: Sally Dupont    Patient is a 37 y.o. female who was admitted last month with a suspected new MS diagnosis. MRI C spine showed a focal area of myelitis at C5 and MRI brain showed a right frontal centrum ovale lesion. She had shingles in a lower back dermatome on in 12/2019. VZV IgM was positive as expected. CSF VZV PCR was negative. LP was done and had 7 WBC with normal protein. She was started on acyclovir and corticosteroids. She was discharged home on 1/31/19  with valacyclovir x 14 days and prednisone taper but returned with recurrent lower extremity and right upper extremity paresthesia. MRI showed a persistent C5 lesion. HIV screen was nonreactive. No strength deficit. No vision deficit.         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 ([address] B02.24    History of shingles Z86.19    Myelitis ([address] G04.91     Past Medical History:   Diagnosis Date    Hashimoto's thyroiditis     HPV in female 12/2015    positive on PAP    Multiple sclerosis ([address] 01/2019    Rheumatoid arthritis ([facility] [address]       Family History   Problem Relation Age of Onset    MS Mother     Diabetes Father         pre-diabetes    Thyroid Cancer Father     Thyroid Disease Sister         hypothyroidism    Liver Disease Brother         autoimmune hepatitis, had liver transplant    Thyroid Disease Brother         hypothyroidism    Thyroid Disease Sister         hypothyroidism    Heart Disease Brother         tachycardia    Breast Cancer Paternal Aunt     Heart Disease Maternal Aunt     Thyroid Disease Maternal Aunt         hyperthyroidism    Thyroid Disease Maternal Grandmother         hypothyroidism    Thyroid Disease Cousin         hypothyroidism      Social History     Tobacco Use    Smoking status: Never Smoker    Smokeless tobacco: Never Used   Substance Use Topics    Alcohol use: Yes     Alcohol/week: 1.8 oz     Types: 3 Standard drinks or equivalent per week     Comment: occ     No past surgical history on file. Prior to Admission medications    Medication Sig Start Date End Date Taking? Authorizing Provider   predniSONE (DELTASONE) 20 mg tablet Take 2 tabs with breakfast x 2 days then 1 tab with breakfast x 3 days then 1/2 tab x 4 days 19   Eliazar Merlin E, DO   LORazepam (ATIVAN) 0.5 mg tablet Take 30 min before procedure. Repeat in 30 min if needed 19   Iva Leon, DO   gabapentin (NEURONTIN) 100 mg capsule Take 1 Cap by mouth three (3) times daily. 19   Iva Leon, DO   sertraline (ZOLOFT) 50 mg tablet Take 1 Tab by mouth daily. 18   Tamir Veronica MD   cholecalciferol, vitamin D3, (VITAMIN D3) 2,000 unit tab Take  by mouth. Provider, Historical       No Known Allergies     Review of Systems:  A comprehensive review of systems was negative except for that written in the History of Present Illness.     Objective:     Visit Vitals  /63 (BP 1 Location: Right arm, BP Patient Position: At rest)   Pulse 70   Temp 97.9 °F (36.6 °C)   Resp 18   Wt 70.8 kg (156 lb)   SpO2 99%   BMI 26.78 kg/m²     Temp (24hrs), Av °F (36.7 °C), Min:97.9 °F (36.6 °C), Max:98 °F (36.7 °C)       Lines:  Peripheral IV:       Physical Exam:    General:  Alert, cooperative, well noursished, well developed, appears stated age   Eyes:  Sclera anicteric. Pupils equally round and reactive to light. Mouth/Throat: Mucous membranes normal, oral pharynx clear   Neck: Supple   Lungs:   Clear to auscultation bilaterally, good effort   CV:  Regular rate and rhythm,no murmur, click, rub or gallop   Abdomen:   Soft, non-tender. bowel sounds normal. non-distended   Extremities: No cyanosis or edema   Skin: Skin color, texture, turgor normal. no acute rash or lesions   Lymph nodes: Cervical and supraclavicular normal   Musculoskeletal: No swelling or deformity   Lines/Devices:  Intact, no erythema, drainage or tenderness   Psych:  Neurologic:  Alert and oriented, normal mood affect given the setting  Intact strength in all extremities       Data Review:     CBC:  Recent Labs     02/23/19  1047   WBC 5.0   GRANS 53   MONOS 12   EOS 1   ANEU 2.7   ABL 1.7   HGB 12.6   HCT 38.2          BMP:  Recent Labs     02/23/19  1047   CREA 0.62   BUN 13      K 3.3*      CO2 25   AGAP 9   *       LFTS:  Recent Labs     02/23/19  1047   TBILI 0.6   ALT 28   SGOT 16   AP 55   TP 6.4   ALB 3.4*       Microbiology:     All Micro Results     Procedure Component Value Units Date/Time    TUBE 2 - CULTURE WITH Nevin Odor, CSF [[id number]] Collected:  02/23/19 1128    Order Status:  Completed Specimen:  Cerebrospinal Fluid Updated:  02/23/19 1340     Special Requests: TUBE 2, CULTURE AND SENSITIVTY AND GRAM STAIN.      GRAM STAIN NO WBCS SEEN         NO DEFINITE ORGANISM SEEN        Culture result: PENDING    HSV 1 AND 2 PCR [[id number]] Collected:  02/23/19 1047    Order Status:  Completed Specimen:  Other from Whole Blood Updated:  02/23/19 1237     Specimen source BLOOD        HSV 1 PENDING     HSV 2 PENDING    MENINGITIS PATHOGENS PANEL (BY PCR), CSF [[id number]] Collected:  02/23/19 1128    Order Status:  Completed Specimen:  Cerebrospinal Fluid Updated:  02/23/19 1149    TUBE 1 - HOLD CSF [[id number]]     Order Status:  Sent Specimen:  Cerebrospinal Fluid     CMV BY PCR, QT, BAL [[id number]]     Order Status:  Sent           Imaging:   MRI C spine (2/21/19)  IMPRESSION: Focal cord signal abnormality at the C5 level appears similar in  size. There is now clearly contrast enhancement following gadolinium, better  seen on the axial images. No contrast-enhancement appreciated on the prior  study. No new cord lesions. Differential diagnosis remains the same, transverse  myelitis versus demyelination versus neoplasia.     Signed By: Smith Clemens MD     February 23, 2019

## 2019-02-23 NOTE — PROCEDURES
Patient was counseled regarding the procedure benefits and risks including 10% risk of post LP headache. She was placed in the left lateral decubitus position prepped and draped. A 21-gauge spinal needle was advanced at the L3-4 interspace but CSF return did not occur. Patient was then rolled to the seated position additional lidocaine was administered and the needle was successfully passed. CSF was clear and colorless. 14 cc of CSF was removed for laboratory analysis.   See orders    Signed By: Meet Emerson MD     February 23, 2019

## 2019-02-23 NOTE — CONSULTS
Impression:    C7 myelopathy with abnormal MRI. MRI findings are progressive including contrast enhancement and increase in size of lesion on axial T2 sequences. The differential diagnosis includes zoster myelitis and clinically isolated syndrome of transverse myelitis. The patient's symptoms have worsened and MRI has progressed following completion of appropriate course of antiviral therapy. Prior workup demonstrated elevated h. Zoster IgM levels. In talking with infectious disease this may or may not be a specific indicator of active h. Zoster infection. Low normal B12 level    Plan:    Repeat lumbar puncture with CSF examination as ordered    Resume IV acyclovir and IV methylprednisolone     Consider plasmapheresis    NMO IgG plasma    B12 1 mg IM    Chief complaint paresthesia    History:    The patient is a very pleasant 17-year-old previously healthy female seen by me in neurology clinic on February 13 when I recorded: \"Holly Wells is a 37 y.o. female who is being seen for Follow-up of [facility] specialization from January 25 of January 31 for myelitis. Patient has a history of shingles on the right buttocks December and developed lower extremity in a sitting focal sensory disturbance. She was seen on January 24 and [address] [place] [address] outpatient neurology and referred for an MRI of the brain and spinal cord. This was performed on the 25th demonstrating an abnormal lesion on at C5 consistent with myelitis with transverse myelitis. Lumbar puncture was performed demonstrating 7 wbc's per cubic millimeter normal protein abnormal CSF IgG studies including 5 oligoclonal bands. CSF herpes zoster IgG was elevated. Patient was treated with acyclovir for 3 days and switched back to oral Valtrex and is currently completing a 3 week course. She states that the numbness in her lower extremities has resolved a little sense of tightness in the right upper extremity.  She has no further skin eruption. She denies pain\"    Since the 13th patient's had progressive and more persistent paresthesias in her upper extremities and a sense of \"vibration\" in her axial torso. This led to a repeat MRI of the cervical spine which showed that the lesion and cervical cord was now enhancing and grown slightly in size. After discussion with the infectious disease service was elected to rehospitalize the patient was an improvement with both steroids and acyclovir and repeat the lumbar puncture for further studies. 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 Social History     Tobacco Use    Smoking status: Never Smoker    Smokeless tobacco: Never Used   Substance Use Topics    Alcohol use: Yes     Alcohol/week: 1.8 oz     Types: 3 Standard drinks or equivalent per week     Comment: occ    Drug use: No     Family History   Problem Relation Age of Onset   [facility] Mother     Diabetes Father         pre-diabetes    Thyroid Cancer Father     Thyroid Disease Sister         hypothyroidism    Liver Disease Brother         autoimmune hepatitis, had liver transplant    Thyroid Disease Brother         hypothyroidism    Thyroid Disease Sister         hypothyroidism    Heart Disease Brother         tachycardia    Breast Cancer Paternal Aunt     Heart Disease Maternal Aunt     Thyroid Disease Maternal Aunt         hyperthyroidism    Thyroid Disease Maternal Grandmother         hypothyroidism    Thyroid Disease Cousin         hypothyroidism     No current facility-administered medications on file prior to encounter.       Current Outpatient Medications on File Prior to Encounter   Medication Sig Dispense Refill    predniSONE (DELTASONE) 20 mg tablet Take 2 tabs with breakfast x 2 days then 1 tab with breakfast x 3 days then 1/2 tab x 4 days 11 Tab 0    LORazepam (ATIVAN) 0.5 mg tablet Take 30 min before procedure. Repeat in 30 min if needed 2 Tab 0    gabapentin (NEURONTIN) 100 mg capsule Take 1 Cap by mouth three (3) times daily. 60 Cap 0    sertraline (ZOLOFT) 50 mg tablet Take 1 Tab by mouth daily. 90 Tab 1    cholecalciferol, vitamin D3, (VITAMIN D3) 2,000 unit tab Take  by mouth. No Known Allergies    12 point review of systems is otherwise negative    Visit Vitals  /77 (BP 1 Location: Right arm, BP Patient Position: At rest)   Pulse 75   Temp 98 °F (36.7 °C)   Resp 18   Wt 156 lb (70.8 kg)   SpO2 97%   BMI 26.78 kg/m²     General: well nourished, appears stated age    Eyes: no proptosis or exophthalmos; conjunctivae clear, sclerae non-icteric    Chest: clear to auscultation    Cardiac: normal S1 S2; no murmurs gallop or rubs    Neurological:    MSE: alert, oriented times 3; fluent speech; no paraphasic errors; follows commands without difficulty    CN 2: visual fields full; no afferent pupillary defect; VA not checked; fundoscopic exam ... CN 3,4,6: Pupils symmetrical in size, reactive to light directly and consensually; no ptosis; full versions and ductions  CN 5: facial sensation intact to light touch and pin prick. Corneal reflex . .. CN 7: Symmetrical facial tone and movements  CN 8 responds to spoken voice  CN 9,10; palate symmetrical gag intact  CN 11: head turn and shoulder shrug intact  CN 12: tongue midline without atrophy or fasiculations    Motor:  Power 5/5 UE and LE proximal to distal  Fine motor movements symmetrical  Tone normal  Atrophy: absent  Gait: symmetrical arm swing, rises on heels and toes    Cerebellar:  Finger to nose; heel to shin intact  Tandem intact    Sensory  Romberg negative  Intact to primary modalities in all 4 extremities    Reflexes    2+ upper extremity reflexes plus4+ knee and ankle jerks bilaterally.   Plantar responses are flexor bilaterally    MRI of the cervical spine reviewed by me shows a posterior enhancing increased T2 lesion at C7

## 2019-02-24 LAB — MENINGITIS PANEL, XMEPT: NORMAL

## 2019-02-24 PROCEDURE — 65270000029 HC RM PRIVATE

## 2019-02-24 PROCEDURE — 74011250636 HC RX REV CODE- 250/636: Performed by: INTERNAL MEDICINE

## 2019-02-24 PROCEDURE — 74011000258 HC RX REV CODE- 258: Performed by: INTERNAL MEDICINE

## 2019-02-24 PROCEDURE — 74011250637 HC RX REV CODE- 250/637: Performed by: INTERNAL MEDICINE

## 2019-02-24 RX ADMIN — Medication 5 ML: at 22:11

## 2019-02-24 RX ADMIN — ACYCLOVIR SODIUM 550 MG: 50 INJECTION, SOLUTION INTRAVENOUS at 18:15

## 2019-02-24 RX ADMIN — SERTRALINE HYDROCHLORIDE 50 MG: 50 TABLET ORAL at 09:05

## 2019-02-24 RX ADMIN — GABAPENTIN 100 MG: 100 CAPSULE ORAL at 09:05

## 2019-02-24 RX ADMIN — Medication 10 ML: at 14:04

## 2019-02-24 RX ADMIN — ACYCLOVIR SODIUM 550 MG: 50 INJECTION, SOLUTION INTRAVENOUS at 09:05

## 2019-02-24 RX ADMIN — GABAPENTIN 100 MG: 100 CAPSULE ORAL at 17:15

## 2019-02-24 RX ADMIN — GABAPENTIN 100 MG: 100 CAPSULE ORAL at 22:10

## 2019-02-24 RX ADMIN — SODIUM CHLORIDE 500 MG: 900 INJECTION, SOLUTION INTRAVENOUS at 10:11

## 2019-02-24 RX ADMIN — ACYCLOVIR SODIUM 550 MG: 50 INJECTION, SOLUTION INTRAVENOUS at 02:26

## 2019-02-24 RX ADMIN — SODIUM CHLORIDE 500 MG: 900 INJECTION, SOLUTION INTRAVENOUS at 22:10

## 2019-02-24 NOTE — PROGRESS NOTES
Hospitalist Progress Note     Admit Date:  2019  8:50 AM   Name:  Bernadette Sandifer   Age:  37 y.o.  :  1975   MRN:  [id number]   PCP:  Tamir Veronica MD  Treatment Team: Attending Provider: Andrea Soliman MD; Consulting Provider: Ana Esparza MD; Consulting Provider: Benjamin Chadwick MD; Charge Nurse: Archie Melendrez    HPI/Subjective:      Ms. Isaac Guy is a 36 yo female with PMH of varicella zoster, hashimotos thyroiditis and admit  for what was felt to be a new multiple sclerosis flare. She began with complaints of LE paresthesia. She had outpatient neurology workup with normal NCV. MRI cspine showed focal T2 abnormality at C5. MRI brain showed right frontal centrum ovale lesion. She had LP that showed elevated VZV Ab and was managed with acyclovir. She was also managed with IV steroids. She was discharged home with outpatient neurology followup and began to have recurrent LE and right UE paresthesia. She was re-evaluated by neurology with followup MRI cspine showing focal C5 lesion. She has been referred to direct admit for concern for ongoing myelitis. She is s/p repeat LP, pending viral PCR, cultures. She has been restarted on IV steroids and acyclovir.  Neuro and ID following       17 feels ok, slept well, ate ok, has less paresthesia and nerve pains, still with hyper-reflexes      Objective:     Patient Vitals for the past 24 hrs:   Temp Pulse Resp BP SpO2   19 0755 98.4 °F (36.9 °C) 73 18 101/65 98 %   19 0341 98 °F (36.7 °C) 73 18 107/64 97 %   19 2320 97.9 °F (36.6 °C) 79 18 104/67 98 %   19 2018 97.9 °F (36.6 °C) 81 18 105/63 98 %   19 1600 98.2 °F (36.8 °C) 75 18 96/60 97 %   19 1200 97.9 °F (36.6 °C) 70 18 100/63 99 %     Oxygen Therapy  O2 Sat (%): 98 % (19 0755)  No intake or output data in the 24 hours ending 19 3085    *Note that automatically entered I/Os may not be accurate; dependent on patient compliance with collection and accurate  by [employer]. General:    Well nourished. Alert. CV:   RRR. No murmur, rub, or gallop. No cyanosis or edema. Lungs:   CTAB. No wheezing, rhonchi, or rales. Extremities: Warm and dry. Skin:     No rashes or jaundice. Neuro:  No gross focal deficits    Data Review:  I have reviewed all labs, meds, and studies from the last 24 hours:    No results found for this or any previous visit (from the past 24 hour(s)). All Micro Results     Procedure Component Value Units Date/Time    TUBE 2 - CULTURE WITH Mohinder Glasgow 115, CSF [[id number]] Collected:  02/23/19 1128    Order Status:  Completed Specimen:  Cerebrospinal Fluid Updated:  02/24/19 1000     Special Requests: TUBE 2, CULTURE AND SENSITIVTY AND GRAM STAIN. GRAM STAIN NO WBCS SEEN         NO DEFINITE ORGANISM SEEN        Culture result: NO GROWTH 1 DAY       MENINGITIS PATHOGENS PANEL (BY PCR), CSF [[id number]] Collected:  02/23/19 1128    Order Status:  Completed Specimen:  Cerebrospinal Fluid Updated:  02/24/19 0626     Meningitis panel       RESULTS SCANNED IN [facility]          HSV 1 AND 2 PCR [[id number]] Collected:  02/23/19 1047    Order Status:  Completed Specimen:  Other from Whole Blood Updated:  02/23/19 1237     Specimen source BLOOD        HSV 1 PENDING     HSV 2 PENDING    TUBE 1 - HOLD CSF [[id number]]     Order Status:  Sent Specimen:  Cerebrospinal Fluid     CMV BY PCR, QT, BAL [[id number]]     Order Status:  Sent           No results found for this visit on 02/23/19.     Current Meds:  Current Facility-Administered Medications   Medication Dose Route Frequency    gabapentin (NEURONTIN) capsule 100 mg  100 mg Oral TID    sertraline (ZOLOFT) tablet 50 mg  50 mg Oral DAILY    sodium chloride (NS) flush 5-40 mL  5-40 mL IntraVENous Q8H    sodium chloride (NS) flush 5-40 mL  5-40 mL IntraVENous PRN    acyclovir (ZOVIRAX) 550 mg in 0.9% sodium chloride 100 mL IVPB  550 mg IntraVENous Q8H    methylPREDNISolone (PF) (Solu-MEDROL) 500 mg in 0.9% sodium chloride 100 mL IVPB  500 mg IntraVENous Q12H       Other Studies (last 24 hours):  No results found. Assessment and Plan:     Hospital Problems as of 2/24/2019 Date Reviewed: 1/28/2019          Codes Class Noted - Resolved POA    * (Principal[facility] ICD-10-CM: G04.91  ICD-9-CM: 323.9  2/23/2019 - Present Yes        History of shingles ICD-10-CM: Z86.19  ICD-9-CM: V12.09  1/29/2019 - Present Yes              Plan:    · Myelitis: pending LP studies for meningitis panel, HSV/EBV/CMV/VZV PCR and serum IgG subclass and HIV, continue solumedrol 500 mg IV every 12 hours and IV acyclovir.  continue home neurontin and zoloft     Discharge planning:  Pending course to home  DVT ppx: SCD  Code status:  Full  Estimated LOS:  Greater than 2 midnights  Risk:  high  Care plan:  Jan, [phone]  Signed:  Janet Acevedo MD        Signed:  Janet Acevedo MD

## 2019-02-24 NOTE — PROGRESS NOTES
02/23/19 0740   Dual Skin Pressure Injury Assessment   Dual Skin Pressure Injury Assessment WDL   Second Care Provider (Based on [address] [place] [address]) Efren Mata RN   Skin Integumentary   Skin Integumentary (WDL) WDL   Skin Color Appropriate for ethnicity   Skin Condition/Temp Dry; Warm   Skin Integrity Intact   Turgor Non-tenting   Hair Growth Present

## 2019-02-24 NOTE — PROGRESS NOTES
Problem: Falls - Risk of  Goal: *Absence of Falls  Document Dheeraj Fall Risk and appropriate interventions in the flowsheet.   Outcome: Progressing Towards Goal  Fall Risk Interventions:            Medication Interventions: Patient to call before getting OOB, Teach patient to arise slowly

## 2019-02-25 ENCOUNTER — APPOINTMENT (OUTPATIENT)
Dept: INTERVENTIONAL RADIOLOGY/VASCULAR | Age: 44
DRG: 099 | End: 2019-02-25
Attending: NURSE PRACTITIONER
Payer: COMMERCIAL

## 2019-02-25 ENCOUNTER — APPOINTMENT (OUTPATIENT)
Dept: ULTRASOUND IMAGING | Age: 44
DRG: 099 | End: 2019-02-25
Attending: NURSE PRACTITIONER
Payer: COMMERCIAL

## 2019-02-25 LAB
ALBUMIN SERPL-MCNC: 3.4 G/DL (ref 3.5–5)
ALBUMIN/GLOB SERPL: 1.1 {RATIO} (ref 1.2–3.5)
ALP SERPL-CCNC: 58 U/L (ref 50–136)
ALT SERPL-CCNC: 38 U/L (ref 12–65)
ANION GAP SERPL CALC-SCNC: 7 MMOL/L (ref 7–16)
APTT PPP: 27.8 SEC (ref 24.7–39.8)
AQP4 H2O CHANNEL AB SERPL IA-ACNC: <1.5 U/ML (ref 0–3)
AST SERPL-CCNC: 21 U/L (ref 15–37)
BASOPHILS # BLD: 0 K/UL (ref 0–0.2)
BASOPHILS NFR BLD: 0 % (ref 0–2)
BILIRUB SERPL-MCNC: 0.4 MG/DL (ref 0.2–1.1)
BUN SERPL-MCNC: 12 MG/DL (ref 6–23)
CALCIUM SERPL-MCNC: 8.6 MG/DL (ref 8.3–10.4)
CHLORIDE SERPL-SCNC: 109 MMOL/L (ref 98–107)
CO2 SERPL-SCNC: 26 MMOL/L (ref 21–32)
CREAT SERPL-MCNC: 0.58 MG/DL (ref 0.6–1)
DIFFERENTIAL METHOD BLD: ABNORMAL
EOSINOPHIL # BLD: 0 K/UL (ref 0–0.8)
EOSINOPHIL NFR BLD: 0 % (ref 0.5–7.8)
ERYTHROCYTE [DISTWIDTH] IN BLOOD BY AUTOMATED COUNT: 13.2 % (ref 11.9–14.6)
FIBRINOGEN PPP-MCNC: 258 MG/DL (ref 190–501)
GLOBULIN SER CALC-MCNC: 3.2 G/DL (ref 2.3–3.5)
GLUCOSE SERPL-MCNC: 125 MG/DL (ref 65–100)
HCT VFR BLD AUTO: 38.4 % (ref 35.8–46.3)
HGB BLD-MCNC: 12.7 G/DL (ref 11.7–15.4)
IMM GRANULOCYTES # BLD AUTO: 0.1 K/UL (ref 0–0.5)
IMM GRANULOCYTES NFR BLD AUTO: 1 % (ref 0–5)
LYMPHOCYTES # BLD: 0.8 K/UL (ref 0.5–4.6)
LYMPHOCYTES NFR BLD: 5 % (ref 13–44)
MAGNESIUM SERPL-MCNC: 2.1 MG/DL (ref 1.8–2.4)
MCH RBC QN AUTO: 30.7 PG (ref 26.1–32.9)
MCHC RBC AUTO-ENTMCNC: 33.1 G/DL (ref 31.4–35)
MCV RBC AUTO: 92.8 FL (ref 79.6–97.8)
MONOCYTES # BLD: 1 K/UL (ref 0.1–1.3)
MONOCYTES NFR BLD: 6 % (ref 4–12)
NEUTS SEG # BLD: 14.4 K/UL (ref 1.7–8.2)
NEUTS SEG NFR BLD: 88 % (ref 43–78)
NRBC # BLD: 0 K/UL (ref 0–0.2)
PLATELET # BLD AUTO: 288 K/UL (ref 150–450)
PMV BLD AUTO: 10.6 FL (ref 9.4–12.3)
POTASSIUM SERPL-SCNC: 3.6 MMOL/L (ref 3.5–5.1)
PROT SERPL-MCNC: 6.6 G/DL (ref 6.3–8.2)
RBC # BLD AUTO: 4.14 M/UL (ref 4.05–5.2)
SODIUM SERPL-SCNC: 142 MMOL/L (ref 136–145)
WBC # BLD AUTO: 16.4 K/UL (ref 4.3–11.1)

## 2019-02-25 PROCEDURE — B214YZZ FLUOROSCOPY OF RIGHT HEART USING OTHER CONTRAST: ICD-10-PCS | Performed by: RADIOLOGY

## 2019-02-25 PROCEDURE — 76700 US EXAM ABDOM COMPLETE: CPT

## 2019-02-25 PROCEDURE — 77030002916 HC SUT ETHLN J&J -A

## 2019-02-25 PROCEDURE — C1894 INTRO/SHEATH, NON-LASER: HCPCS

## 2019-02-25 PROCEDURE — 74011250636 HC RX REV CODE- 250/636: Performed by: PHYSICIAN ASSISTANT

## 2019-02-25 PROCEDURE — 77001 FLUOROGUIDE FOR VEIN DEVICE: CPT

## 2019-02-25 PROCEDURE — 74011250637 HC RX REV CODE- 250/637: Performed by: INTERNAL MEDICINE

## 2019-02-25 PROCEDURE — 83735 ASSAY OF MAGNESIUM: CPT

## 2019-02-25 PROCEDURE — 74011000258 HC RX REV CODE- 258: Performed by: INTERNAL MEDICINE

## 2019-02-25 PROCEDURE — 76937 US GUIDE VASCULAR ACCESS: CPT

## 2019-02-25 PROCEDURE — 85025 COMPLETE CBC W/AUTO DIFF WBC: CPT

## 2019-02-25 PROCEDURE — 74011250636 HC RX REV CODE- 250/636: Performed by: INTERNAL MEDICINE

## 2019-02-25 PROCEDURE — 85384 FIBRINOGEN ACTIVITY: CPT

## 2019-02-25 PROCEDURE — P9045 ALBUMIN (HUMAN), 5%, 250 ML: HCPCS | Performed by: INTERNAL MEDICINE

## 2019-02-25 PROCEDURE — 36415 COLL VENOUS BLD VENIPUNCTURE: CPT

## 2019-02-25 PROCEDURE — C1750 CATH, HEMODIALYSIS,LONG-TERM: HCPCS

## 2019-02-25 PROCEDURE — B244ZZZ ULTRASONOGRAPHY OF RIGHT HEART: ICD-10-PCS | Performed by: RADIOLOGY

## 2019-02-25 PROCEDURE — 80053 COMPREHEN METABOLIC PANEL: CPT

## 2019-02-25 PROCEDURE — 65270000029 HC RM PRIVATE

## 2019-02-25 PROCEDURE — 99223 1ST HOSP IP/OBS HIGH 75: CPT | Performed by: INTERNAL MEDICINE

## 2019-02-25 PROCEDURE — 77030018719 HC DRSG PTCH ANTIMIC J&J -A

## 2019-02-25 PROCEDURE — 36514 APHERESIS PLASMA: CPT

## 2019-02-25 PROCEDURE — 74011250636 HC RX REV CODE- 250/636: Performed by: NURSE PRACTITIONER

## 2019-02-25 PROCEDURE — 85730 THROMBOPLASTIN TIME PARTIAL: CPT

## 2019-02-25 PROCEDURE — 02H633Z INSERTION OF INFUSION DEVICE INTO RIGHT ATRIUM, PERCUTANEOUS APPROACH: ICD-10-PCS | Performed by: RADIOLOGY

## 2019-02-25 PROCEDURE — 76856 US EXAM PELVIC COMPLETE: CPT

## 2019-02-25 RX ORDER — SODIUM CHLORIDE 9 MG/ML
10 INJECTION INTRAMUSCULAR; INTRAVENOUS; SUBCUTANEOUS AS NEEDED
Status: CANCELLED | OUTPATIENT
Start: 2019-03-05

## 2019-02-25 RX ORDER — ALBUTEROL SULFATE 0.83 MG/ML
2.5 SOLUTION RESPIRATORY (INHALATION) AS NEEDED
Status: CANCELLED
Start: 2019-03-03

## 2019-02-25 RX ORDER — ACETAMINOPHEN 325 MG/1
650 TABLET ORAL AS NEEDED
Status: CANCELLED
Start: 2019-03-01

## 2019-02-25 RX ORDER — ACETAMINOPHEN 325 MG/1
650 TABLET ORAL
Status: CANCELLED
Start: 2019-03-05 | End: 2019-03-05

## 2019-02-25 RX ORDER — ACETAMINOPHEN 325 MG/1
650 TABLET ORAL
Status: CANCELLED
Start: 2019-02-27 | End: 2019-02-27

## 2019-02-25 RX ORDER — DIPHENHYDRAMINE HYDROCHLORIDE 50 MG/ML
50 INJECTION, SOLUTION INTRAMUSCULAR; INTRAVENOUS AS NEEDED
Status: CANCELLED
Start: 2019-03-03

## 2019-02-25 RX ORDER — SODIUM CHLORIDE 0.9 % (FLUSH) 0.9 %
10 SYRINGE (ML) INJECTION AS NEEDED
Status: CANCELLED
Start: 2019-03-03

## 2019-02-25 RX ORDER — SODIUM CHLORIDE 9 MG/ML
10 INJECTION INTRAMUSCULAR; INTRAVENOUS; SUBCUTANEOUS AS NEEDED
Status: CANCELLED | OUTPATIENT
Start: 2019-03-01

## 2019-02-25 RX ORDER — DIPHENHYDRAMINE HYDROCHLORIDE 50 MG/ML
50 INJECTION, SOLUTION INTRAMUSCULAR; INTRAVENOUS AS NEEDED
Status: CANCELLED
Start: 2019-02-25

## 2019-02-25 RX ORDER — SODIUM CHLORIDE 0.9 % (FLUSH) 0.9 %
10-40 SYRINGE (ML) INJECTION AS NEEDED
Status: DISCONTINUED | OUTPATIENT
Start: 2019-02-25 | End: 2019-02-28 | Stop reason: HOSPADM

## 2019-02-25 RX ORDER — EPINEPHRINE 1 MG/ML
0.3 INJECTION, SOLUTION, CONCENTRATE INTRAVENOUS AS NEEDED
Status: CANCELLED | OUTPATIENT
Start: 2019-02-27

## 2019-02-25 RX ORDER — DIPHENHYDRAMINE HYDROCHLORIDE 50 MG/ML
50 INJECTION, SOLUTION INTRAMUSCULAR; INTRAVENOUS AS NEEDED
Status: CANCELLED
Start: 2019-03-05

## 2019-02-25 RX ORDER — EPINEPHRINE 1 MG/ML
0.3 INJECTION, SOLUTION, CONCENTRATE INTRAVENOUS AS NEEDED
Status: CANCELLED | OUTPATIENT
Start: 2019-03-05

## 2019-02-25 RX ORDER — HYDROCORTISONE SODIUM SUCCINATE 100 MG/2ML
100 INJECTION, POWDER, FOR SOLUTION INTRAMUSCULAR; INTRAVENOUS AS NEEDED
Status: CANCELLED | OUTPATIENT
Start: 2019-03-01

## 2019-02-25 RX ORDER — ALBUTEROL SULFATE 0.83 MG/ML
2.5 SOLUTION RESPIRATORY (INHALATION) AS NEEDED
Status: CANCELLED
Start: 2019-03-01

## 2019-02-25 RX ORDER — DIPHENHYDRAMINE HYDROCHLORIDE 50 MG/ML
50 INJECTION, SOLUTION INTRAMUSCULAR; INTRAVENOUS AS NEEDED
Status: CANCELLED
Start: 2019-03-01

## 2019-02-25 RX ORDER — DIPHENHYDRAMINE HYDROCHLORIDE 50 MG/ML
50 INJECTION, SOLUTION INTRAMUSCULAR; INTRAVENOUS AS NEEDED
Status: CANCELLED
Start: 2019-02-27

## 2019-02-25 RX ORDER — ALBUTEROL SULFATE 0.83 MG/ML
2.5 SOLUTION RESPIRATORY (INHALATION) AS NEEDED
Status: CANCELLED
Start: 2019-02-25

## 2019-02-25 RX ORDER — ALBUMIN HUMAN 50 G/1000ML
2900 SOLUTION INTRAVENOUS ONCE
Status: COMPLETED | OUTPATIENT
Start: 2019-02-25 | End: 2019-02-25

## 2019-02-25 RX ORDER — HEPARIN SODIUM 1000 [USP'U]/ML
2000-5000 INJECTION, SOLUTION INTRAVENOUS; SUBCUTANEOUS ONCE
Status: COMPLETED | OUTPATIENT
Start: 2019-02-25 | End: 2019-02-25

## 2019-02-25 RX ORDER — HYDROCORTISONE SODIUM SUCCINATE 100 MG/2ML
100 INJECTION, POWDER, FOR SOLUTION INTRAMUSCULAR; INTRAVENOUS AS NEEDED
Status: CANCELLED | OUTPATIENT
Start: 2019-03-05

## 2019-02-25 RX ORDER — LIDOCAINE HYDROCHLORIDE 20 MG/ML
2-20 INJECTION, SOLUTION INFILTRATION; PERINEURAL ONCE
Status: COMPLETED | OUTPATIENT
Start: 2019-02-25 | End: 2019-02-25

## 2019-02-25 RX ORDER — DIPHENHYDRAMINE HCL 25 MG
25 CAPSULE ORAL
Status: CANCELLED
Start: 2019-03-01 | End: 2019-03-01

## 2019-02-25 RX ORDER — ONDANSETRON 2 MG/ML
8 INJECTION INTRAMUSCULAR; INTRAVENOUS AS NEEDED
Status: CANCELLED | OUTPATIENT
Start: 2019-02-27

## 2019-02-25 RX ORDER — ACETAMINOPHEN 325 MG/1
650 TABLET ORAL
Status: CANCELLED
Start: 2019-02-25 | End: 2019-02-25

## 2019-02-25 RX ORDER — SODIUM CHLORIDE 0.9 % (FLUSH) 0.9 %
10 SYRINGE (ML) INJECTION AS NEEDED
Status: CANCELLED
Start: 2019-02-25

## 2019-02-25 RX ORDER — DIPHENHYDRAMINE HCL 25 MG
25 CAPSULE ORAL
Status: CANCELLED
Start: 2019-02-27 | End: 2019-02-27

## 2019-02-25 RX ORDER — ACETAMINOPHEN 325 MG/1
650 TABLET ORAL
Status: CANCELLED
Start: 2019-03-03 | End: 2019-03-03

## 2019-02-25 RX ORDER — DIPHENHYDRAMINE HCL 25 MG
25 CAPSULE ORAL
Status: CANCELLED
Start: 2019-03-03 | End: 2019-03-03

## 2019-02-25 RX ORDER — SODIUM CHLORIDE 9 MG/ML
25 INJECTION, SOLUTION INTRAVENOUS CONTINUOUS
Status: CANCELLED | OUTPATIENT
Start: 2019-03-01

## 2019-02-25 RX ORDER — ALBUTEROL SULFATE 0.83 MG/ML
2.5 SOLUTION RESPIRATORY (INHALATION) AS NEEDED
Status: CANCELLED
Start: 2019-02-27

## 2019-02-25 RX ORDER — EPINEPHRINE 1 MG/ML
0.3 INJECTION, SOLUTION, CONCENTRATE INTRAVENOUS AS NEEDED
Status: CANCELLED | OUTPATIENT
Start: 2019-02-25

## 2019-02-25 RX ORDER — SODIUM CHLORIDE 9 MG/ML
10 INJECTION INTRAMUSCULAR; INTRAVENOUS; SUBCUTANEOUS AS NEEDED
Status: CANCELLED | OUTPATIENT
Start: 2019-02-25

## 2019-02-25 RX ORDER — HEPARIN SODIUM 200 [USP'U]/100ML
1000 INJECTION, SOLUTION INTRAVENOUS AS NEEDED
Status: DISCONTINUED | OUTPATIENT
Start: 2019-02-25 | End: 2019-02-25

## 2019-02-25 RX ORDER — SODIUM CHLORIDE 9 MG/ML
25 INJECTION, SOLUTION INTRAVENOUS CONTINUOUS
Status: CANCELLED | OUTPATIENT
Start: 2019-02-25

## 2019-02-25 RX ORDER — EPINEPHRINE 1 MG/ML
0.3 INJECTION, SOLUTION, CONCENTRATE INTRAVENOUS AS NEEDED
Status: CANCELLED | OUTPATIENT
Start: 2019-03-03

## 2019-02-25 RX ORDER — SODIUM CHLORIDE 0.9 % (FLUSH) 0.9 %
10 SYRINGE (ML) INJECTION AS NEEDED
Status: CANCELLED
Start: 2019-03-05

## 2019-02-25 RX ORDER — ACETAMINOPHEN 325 MG/1
650 TABLET ORAL AS NEEDED
Status: CANCELLED
Start: 2019-03-05

## 2019-02-25 RX ORDER — SODIUM CHLORIDE 9 MG/ML
25 INJECTION, SOLUTION INTRAVENOUS CONTINUOUS
Status: CANCELLED | OUTPATIENT
Start: 2019-03-05

## 2019-02-25 RX ORDER — SODIUM CHLORIDE 9 MG/ML
10 INJECTION INTRAMUSCULAR; INTRAVENOUS; SUBCUTANEOUS AS NEEDED
Status: CANCELLED | OUTPATIENT
Start: 2019-02-27

## 2019-02-25 RX ORDER — HYDROCORTISONE SODIUM SUCCINATE 100 MG/2ML
100 INJECTION, POWDER, FOR SOLUTION INTRAMUSCULAR; INTRAVENOUS AS NEEDED
Status: CANCELLED | OUTPATIENT
Start: 2019-02-25

## 2019-02-25 RX ORDER — HYDROCORTISONE SODIUM SUCCINATE 100 MG/2ML
100 INJECTION, POWDER, FOR SOLUTION INTRAMUSCULAR; INTRAVENOUS AS NEEDED
Status: CANCELLED | OUTPATIENT
Start: 2019-02-27

## 2019-02-25 RX ORDER — SODIUM CHLORIDE 0.9 % (FLUSH) 0.9 %
10 SYRINGE (ML) INJECTION AS NEEDED
Status: CANCELLED
Start: 2019-03-01

## 2019-02-25 RX ORDER — ONDANSETRON 2 MG/ML
8 INJECTION INTRAMUSCULAR; INTRAVENOUS AS NEEDED
Status: CANCELLED | OUTPATIENT
Start: 2019-03-05

## 2019-02-25 RX ORDER — ACETAMINOPHEN 325 MG/1
650 TABLET ORAL AS NEEDED
Status: CANCELLED
Start: 2019-02-25

## 2019-02-25 RX ORDER — ONDANSETRON 2 MG/ML
8 INJECTION INTRAMUSCULAR; INTRAVENOUS AS NEEDED
Status: CANCELLED | OUTPATIENT
Start: 2019-02-25

## 2019-02-25 RX ORDER — VALACYCLOVIR HYDROCHLORIDE 500 MG/1
1000 TABLET, FILM COATED ORAL DAILY
Status: DISCONTINUED | OUTPATIENT
Start: 2019-02-26 | End: 2019-02-28 | Stop reason: HOSPADM

## 2019-02-25 RX ORDER — HYDROCORTISONE SODIUM SUCCINATE 100 MG/2ML
100 INJECTION, POWDER, FOR SOLUTION INTRAMUSCULAR; INTRAVENOUS AS NEEDED
Status: CANCELLED | OUTPATIENT
Start: 2019-03-03

## 2019-02-25 RX ORDER — DIPHENHYDRAMINE HCL 25 MG
25 CAPSULE ORAL
Status: CANCELLED
Start: 2019-03-05 | End: 2019-03-05

## 2019-02-25 RX ORDER — ACETAMINOPHEN 325 MG/1
650 TABLET ORAL AS NEEDED
Status: CANCELLED
Start: 2019-02-27

## 2019-02-25 RX ORDER — DIPHENHYDRAMINE HCL 25 MG
25 CAPSULE ORAL
Status: CANCELLED
Start: 2019-02-25 | End: 2019-02-25

## 2019-02-25 RX ORDER — ONDANSETRON 2 MG/ML
8 INJECTION INTRAMUSCULAR; INTRAVENOUS AS NEEDED
Status: CANCELLED | OUTPATIENT
Start: 2019-03-01

## 2019-02-25 RX ORDER — ALBUTEROL SULFATE 0.83 MG/ML
2.5 SOLUTION RESPIRATORY (INHALATION) AS NEEDED
Status: CANCELLED
Start: 2019-03-05

## 2019-02-25 RX ORDER — ACETAMINOPHEN 325 MG/1
650 TABLET ORAL AS NEEDED
Status: CANCELLED
Start: 2019-03-03

## 2019-02-25 RX ORDER — ACETAMINOPHEN 325 MG/1
650 TABLET ORAL
Status: CANCELLED
Start: 2019-03-01 | End: 2019-03-01

## 2019-02-25 RX ORDER — SODIUM CHLORIDE 9 MG/ML
25 INJECTION, SOLUTION INTRAVENOUS CONTINUOUS
Status: CANCELLED | OUTPATIENT
Start: 2019-03-03

## 2019-02-25 RX ORDER — SODIUM CHLORIDE 9 MG/ML
10 INJECTION INTRAMUSCULAR; INTRAVENOUS; SUBCUTANEOUS AS NEEDED
Status: CANCELLED | OUTPATIENT
Start: 2019-03-03

## 2019-02-25 RX ORDER — ONDANSETRON 2 MG/ML
8 INJECTION INTRAMUSCULAR; INTRAVENOUS AS NEEDED
Status: CANCELLED | OUTPATIENT
Start: 2019-03-03

## 2019-02-25 RX ORDER — EPINEPHRINE 1 MG/ML
0.3 INJECTION, SOLUTION, CONCENTRATE INTRAVENOUS AS NEEDED
Status: CANCELLED | OUTPATIENT
Start: 2019-03-01

## 2019-02-25 RX ADMIN — SERTRALINE HYDROCHLORIDE 50 MG: 50 TABLET ORAL at 08:52

## 2019-02-25 RX ADMIN — GABAPENTIN 100 MG: 100 CAPSULE ORAL at 08:52

## 2019-02-25 RX ADMIN — LIDOCAINE HYDROCHLORIDE 200 MG: 20 INJECTION, SOLUTION INFILTRATION; PERINEURAL at 11:38

## 2019-02-25 RX ADMIN — ALBUMIN (HUMAN) 145 G: 12.5 INJECTION, SOLUTION INTRAVENOUS at 20:18

## 2019-02-25 RX ADMIN — GABAPENTIN 100 MG: 100 CAPSULE ORAL at 16:40

## 2019-02-25 RX ADMIN — SODIUM CHLORIDE 500 MG: 900 INJECTION, SOLUTION INTRAVENOUS at 23:08

## 2019-02-25 RX ADMIN — ACYCLOVIR SODIUM 550 MG: 50 INJECTION, SOLUTION INTRAVENOUS at 09:58

## 2019-02-25 RX ADMIN — ACYCLOVIR SODIUM 550 MG: 50 INJECTION, SOLUTION INTRAVENOUS at 01:50

## 2019-02-25 RX ADMIN — CALCIUM GLUCONATE 2 G: 98 INJECTION, SOLUTION INTRAVENOUS at 20:18

## 2019-02-25 RX ADMIN — GABAPENTIN 100 MG: 100 CAPSULE ORAL at 23:08

## 2019-02-25 RX ADMIN — HEPARIN SODIUM 2000 UNITS: 200 INJECTION, SOLUTION INTRAVENOUS at 11:29

## 2019-02-25 RX ADMIN — HEPARIN SODIUM 2600 UNITS: 1000 INJECTION INTRAVENOUS; SUBCUTANEOUS at 11:42

## 2019-02-25 RX ADMIN — SODIUM CHLORIDE 500 MG: 900 INJECTION, SOLUTION INTRAVENOUS at 12:33

## 2019-02-25 RX ADMIN — Medication 10 ML: at 23:08

## 2019-02-25 NOTE — PROGRESS NOTES
Was called by someone this morning telling me not to give the plasmapheresis because an infusion nurse would be coming to give it. Called 5th floor to find out if they knew who was coming or could direct me to the right person. 5th  is looking into who is on call and will notify this nurse.

## 2019-02-25 NOTE — PROGRESS NOTES
TRANSFER - OUT REPORT:    Verbal report given to Eden Trevizo RN on Cat Chase  being transferred to 7th floor for routine post - op       Report consisted of patients Situation, Background, Assessment and   Recommendations(SBAR). Information from the following report(s) SBAR, Kardex, Procedure Summary and MAR was reviewed with the receiving nurse. Lines:       Opportunity for questions and clarification was provided.

## 2019-02-25 NOTE — PROGRESS NOTES
Problem: Falls - Risk of  Goal: *Absence of Falls  Document Dheeraj Fall Risk and appropriate interventions in the flowsheet.   Outcome: Progressing Towards Goal  Fall Risk Interventions:            Medication Interventions: Bed/chair exit alarm, Patient to call before getting OOB, Teach patient to arise slowly

## 2019-02-25 NOTE — PROGRESS NOTES
Problem: Falls - Risk of  Goal: *Absence of Falls  Document Dheeraj Fall Risk and appropriate interventions in the flowsheet. Outcome: Progressing Towards Goal  Fall Risk Interventions:            Medication Interventions: Teach patient to arise slowly     Patient remains free from falls at this time. Patient is independent.

## 2019-02-25 NOTE — PROGRESS NOTES
Spoke with Lissette the director for the 5th floor who informed this nurse that the infusion nurse should be arriving at [address] [place] [address].

## 2019-02-25 NOTE — PROGRESS NOTES
Hospitalist Progress Note     Admit Date:  2019  8:50 AM   Name:  Zion Maxwell   Age:  37 y.o.  :  1975   MRN:  [id number]   PCP:  Suzi Kennedy MD  Treatment Team: Attending Provider: Kristal Vinson MD; Consulting Provider: Danny Moise MD; Consulting Provider: Mis Jacob MD; Utilization Review: Austin Parisi RN; Charge Nurse: Sis Aguilar Consulting Provider: Isma Vela MD    HPI/Subjective:      Ms. Jamarcus Wise is a 38 yo female with PMH of varicella zoster, hashimotos thyroiditis and admit  for what was felt to be a new multiple sclerosis flare. She began with complaints of LE paresthesia. She had outpatient neurology workup with normal NCV. MRI cspine showed focal T2 abnormality at C5. MRI brain showed right frontal centrum ovale lesion. She had LP that showed elevated VZV Ab and was managed with acyclovir. She was also managed with IV steroids. She was discharged home with outpatient neurology followup and began to have recurrent LE and right UE paresthesia. She was re-evaluated by neurology with followup MRI cspine showing increase of the focal C5 lesion. She has been referred to direct admit for concern for ongoing myelitis. She is s/p repeat LP, pending viral PCR, but negative bacterial  cultures. She has been restarted on IV steroids and acyclovir--> oral valtrex . Neuro and ID following. It is recommended to have hematology consult for apheresis. IR placed apheresis lineon 19 and she will have 5 treatments over 10 days. Also pelvic and ABD US ordered for malignancy workup per neurology. She is up to date with age related cancer screenings.        19 NPO, poor sleep, still some paresthesia     Objective:     Patient Vitals for the past 24 hrs:   Temp Pulse Resp BP SpO2   19 1211 98.1 °F (36.7 °C) (!) 59 18 111/70 98 %   19 1155 -- 61 18 110/64 98 %   19 1103 98.1 °F (36.7 °C) 64 18 109/61 99 %   19 0739 98.6 °F (37 °C) 65 20 102/67 98 %   02/25/19 0318 98.1 °F (36.7 °C) 70 18 95/58 97 %   02/24/19 2353 98.4 °F (36.9 °C) 61 18 100/66 96 %   02/24/19 2109 98.2 °F (36.8 °C) 77 18 105/67 97 %   02/24/19 1540 98 °F (36.7 °C) 75 18 103/69 97 %     Oxygen Therapy  O2 Sat (%): 98 % (02/25/19 1211)  O2 Device: Room air (02/25/19 1155)  No intake or output data in the 24 hours ending 02/25/19 1527    *Note that automatically entered I/Os may not be accurate; dependent on patient compliance with collection and accurate  by techs. General:    Well nourished. Alert. No distress   CV:   RRR. No murmur, rub, or gallop. No cyanosis or edema. Lungs:   CTAB. No wheezing, rhonchi, or rales. Anterior exam  Extremities: Warm and dry. Skin:     No rashes or jaundice. Neuro:  No gross focal deficits    Data Review:  I have reviewed all labs, meds, and studies from the last 24 hours:    Recent Results (from the past 24 hour(s))   CBC WITH AUTOMATED DIFF    Collection Time: 02/25/19 12:12 PM   Result Value Ref Range    WBC 16.4 (H) 4.3 - 11.1 K/uL    RBC 4.14 4.05 - 5.2 M/uL    HGB 12.7 11.7 - 15.4 g/dL    HCT 38.4 35.8 - 46.3 %    MCV 92.8 79.6 - 97.8 FL    MCH 30.7 26.1 - 32.9 PG    MCHC 33.1 31.4 - 35.0 g/dL    RDW 13.2 11.9 - 14.6 %    PLATELET 247 640 - 038 K/uL    MPV 10.6 9.4 - 12.3 FL    ABSOLUTE NRBC 0.00 0.0 - 0.2 K/uL    DF AUTOMATED      NEUTROPHILS 88 (H) 43 - 78 %    LYMPHOCYTES 5 (L) 13 - 44 %    MONOCYTES 6 4.0 - 12.0 %    EOSINOPHILS 0 (L) 0.5 - 7.8 %    BASOPHILS 0 0.0 - 2.0 %    IMMATURE GRANULOCYTES 1 0.0 - 5.0 %    ABS. NEUTROPHILS 14.4 (H) 1.7 - 8.2 K/UL    ABS. LYMPHOCYTES 0.8 0.5 - 4.6 K/UL    ABS. MONOCYTES 1.0 0.1 - 1.3 K/UL    ABS. EOSINOPHILS 0.0 0.0 - 0.8 K/UL    ABS. BASOPHILS 0.0 0.0 - 0.2 K/UL    ABS. IMM.  GRANS. 0.1 0.0 - 0.5 K/UL   METABOLIC PANEL, COMPREHENSIVE    Collection Time: 02/25/19 12:12 PM   Result Value Ref Range    Sodium 142 136 - 145 mmol/L    Potassium 3.6 3.5 - 5.1 mmol/L    Chloride 109 (H) 98 - 107 mmol/L    CO2 26 21 - 32 mmol/L    Anion gap 7 7 - 16 mmol/L    Glucose 125 (H) 65 - 100 mg/dL    BUN 12 6 - 23 MG/DL    Creatinine 0.58 (L) 0.6 - 1.0 MG/DL    GFR est AA >60 >60 ml/min/1.73m2    GFR est non-AA >60 >60 ml/min/1.73m2    Calcium 8.6 8.3 - 10.4 MG/DL    Bilirubin, total 0.4 0.2 - 1.1 MG/DL    ALT (SGPT) 38 12 - 65 U/L    AST (SGOT) 21 15 - 37 U/L    Alk. phosphatase 58 50 - 136 U/L    Protein, total 6.6 6.3 - 8.2 g/dL    Albumin 3.4 (L) 3.5 - 5.0 g/dL    Globulin 3.2 2.3 - 3.5 g/dL    A-G Ratio 1.1 (L) 1.2 - 3.5     MAGNESIUM    Collection Time: 02/25/19 12:12 PM   Result Value Ref Range    Magnesium 2.1 1.8 - 2.4 mg/dL   PTT    Collection Time: 02/25/19 12:12 PM   Result Value Ref Range    aPTT 27.8 24.7 - 39.8 SEC   FIBRINOGEN    Collection Time: 02/25/19 12:12 PM   Result Value Ref Range    Fibrinogen 258 190 - 501 mg/dL        All Micro Results     Procedure Component Value Units Date/Time    TUBE 2 - CULTURE WITH Jennifer Olivares, CSF [[id number]] Collected:  02/23/19 1128    Order Status:  Completed Specimen:  Cerebrospinal Fluid Updated:  02/25/19 1104     Special Requests: TUBE 2, CULTURE AND SENSITIVTY AND GRAM STAIN. GRAM STAIN NO WBCS SEEN         NO DEFINITE ORGANISM SEEN        Culture result: NO GROWTH 2 DAYS       MENINGITIS PATHOGENS PANEL (BY PCR), CSF [[id number]] Collected:  02/23/19 1128    Order Status:  Completed Specimen:  Cerebrospinal Fluid Updated:  02/24/19 0626     Meningitis panel       RESULTS SCANNED IN [facility]          HSV 1 AND 2 PCR [[id number]] Collected:  02/23/19 1047    Order Status:  Completed Specimen:  Other from Whole Blood Updated:  02/23/19 1237     Specimen source BLOOD        HSV 1 PENDING     HSV 2 PENDING    TUBE 1 - HOLD CSF [[id number]]     Order Status:  Sent Specimen:  Cerebrospinal Fluid     CMV BY PCR, QT, BAL [[id number]]     Order Status:  Sent           No results found for this visit on 02/23/19.     Current Meds:  Current Facility-Administered Medications   Medication Dose Route Frequency    calcium gluconate 2 g in 0.9% sodium chloride 250 mL  2 g IntraVENous ONCE    albumin human 5% (BUMINATE) solution 145 g  2,900 mL IntraVENous ONCE    sodium chloride (NS) flush 10-40 mL  10-40 mL IntraVENous PRN    [START ON 2/26/2019] valACYclovir (VALTREX) tablet 1,000 mg  1,000 mg Oral DAILY    gabapentin (NEURONTIN) capsule 100 mg  100 mg Oral TID    sertraline (ZOLOFT) tablet 50 mg  50 mg Oral DAILY    sodium chloride (NS) flush 5-40 mL  5-40 mL IntraVENous Q8H    sodium chloride (NS) flush 5-40 mL  5-40 mL IntraVENous PRN    methylPREDNISolone (PF) (Solu-MEDROL) 500 mg in 0.9% sodium chloride 100 mL IVPB  500 mg IntraVENous Q12H       Other Studies (last 24 hours):  Ir Insert Non Tunl Cvc Over 5 Yrs    Result Date: 2/25/2019  Title:  Temporary pheresis catheter placement. Indication:  25-year-old female with large terminating numbness, paresthesias, myelitis. :  Soledad Bashir PA-C Supervising Physician: Seth Adam M.D. Consent: Informed written and oral consent was obtained from the patient after explanation of benefits and risks (including, but not limited to: Infection, hemorrhage, pneumothorax). The patient's questions were answered to their satisfaction. The patient stated understanding and requested that we proceed. Procedure:   Maximal sterile barrier technique employed utilizing hat, facemask, hand hygiene, cutaneous antisepsis, sterile gown, sterile gloves large sterile drape, sterile ultrasound probe cover and sterile ultrasound gel. Following routine prep and drape of the RIGHT neck, a local field block with lidocaine was achieved. Ultrasound evaluation of all potential access sites was performed due to lack of a palpable vein. No veins were palpable due to overlying adipose tissue.   Using real-time ultrasound guidance, with appropriate image recording and visualization of vascular needle entry, the patent RIGHT internal jugular vein was accessed using micropuncture technique. Using fluoroscopy, a triple-lumen pheresis catheter was advanced over the wire positioning the tip in the right atrium. All lumens aspirated easily and were filled with heparinized saline. The catheter was secured with nonabsorbable suture. Sterile dressings were applied. Complications: None. Radiation dose:  65 cGy.cm2 Dose Area Product. mGy: 3 Fluoroscopy time:  12 seconds 0 permanent images were obtained. Contrast:  0 ml. Medications:  Lidocaine Findings:  Patent right internal jugular vein. Catheter tip in the mid right atrium. Impression:  Technically successful temporary pheresis catheter placement. Plan:  Catheter is ready for use. Assessment and Plan:     Hospital Problems as of 2/25/2019 Date Reviewed: 1/28/2019          Codes Class Noted - Resolved POA    * (Principal) [facility] ICD-10-CM: G04.91  ICD-9-CM: 323.9  2/23/2019 - Present Yes        History of shingles ICD-10-CM: Z86.19  ICD-9-CM: V12.09  1/29/2019 - Present Yes              Plan:    · Myelitis: pending LP studies for  HSV/EBV/CMV/VZV PCR and serum IgG subclass, continue solumedrol 500 mg IV every 12 hours and changed to oral valtrex. Pending ABD US and pelvic US. Appreciate neuro, heme and ID, starting apheresis 2-25-19 for 5 total treatments.   continue home neurontin and zoloft     Discharge planning:  Pending course to home  DVT ppx: SCD  Code status:  Full  Estimated LOS:  Greater than 2 midnights  Risk:  high  Care plan:  Jan, [phone]  Signed:  Arnaud Terry MD        Signed:  Arnaud Terry MD

## 2019-02-25 NOTE — PROGRESS NOTES
Neurology Daily Progress Note     Assessment:     C5 myelopathy with abnormal MRI. MRI findings are progressive including contrast enhancement and increase in size of lesion on axial T2 sequences. The differential diagnosis includes zoster myelitis ,clinically isolated syndrome of transverse myelitis, and neoplasm. The patient's symptoms have worsened and MRI has progressed following completion of appropriate course of antiviral therapy. Plan:     Continue IV acyclovir and methylprednisolone    Recommend apheresis. Heme/onc consulted    Abdomen/pelvis ultrasound to rule out neoplasm    Subjective: Interval history:    Patient reports some continued paraesthesias in BLE, mainly with walking. Tolerating medications. History:    Blanca Parker is a 37 y.o. female who is being seen for parasthesias. Review of systems negative with exception of pertinent positives and negatives noted above.        Objective:     Vitals:    02/25/19 0739 02/25/19 1103 02/25/19 1155 02/25/19 1211   BP: 102/67 109/61 110/64 111/70   Pulse: 65 64 61 (!) 59   Resp: 20 18 18 18   Temp: 98.6 °F (37 °C) 98.1 °F (36.7 °C)  98.1 °F (36.7 °C)   SpO2: 98% 99% 98% 98%   Weight:              Current Facility-Administered Medications:     gabapentin (NEURONTIN) capsule 100 mg, 100 mg, Oral, TID, Maira Murillo MD, 100 mg at 02/25/19 0852    sertraline (ZOLOFT) tablet 50 mg, 50 mg, Oral, DAILY, Maira Murillo MD, 50 mg at 02/25/19 1094    sodium chloride (NS) flush 5-40 mL, 5-40 mL, IntraVENous, Q8H, Zo Murillo MD, Stopped at 02/25/19 0600    sodium chloride (NS) flush 5-40 mL, 5-40 mL, IntraVENous, PRN, Maira Murillo MD    acyclovir (ZOVIRAX) 550 mg in 0.9% sodium chloride 100 mL IVPB, 550 mg, IntraVENous, Q8H, Zo Murillo MD, 550 mg at 02/25/19 0958    methylPREDNISolone (PF) (Solu-MEDROL) 500 mg in 0.9% sodium chloride 100 mL IVPB, 500 mg, IntraVENous, Q12H, Angie Givens MD, Last Rate: 200 mL/hr at 02/25/19 1233, 500 mg at 02/25/19 1233    Recent Results (from the past 12 hour(s))   CBC WITH AUTOMATED DIFF    Collection Time: 02/25/19 12:12 PM   Result Value Ref Range    WBC 16.4 (H) 4.3 - 11.1 K/uL    RBC 4.14 4.05 - 5.2 M/uL    HGB 12.7 11.7 - 15.4 g/dL    HCT 38.4 35.8 - 46.3 %    MCV 92.8 79.6 - 97.8 FL    MCH 30.7 26.1 - 32.9 PG    MCHC 33.1 31.4 - 35.0 g/dL    RDW 13.2 11.9 - 14.6 %    PLATELET 080 406 - 027 K/uL    MPV 10.6 9.4 - 12.3 FL    ABSOLUTE NRBC 0.00 0.0 - 0.2 K/uL    DF AUTOMATED      NEUTROPHILS 88 (H) 43 - 78 %    LYMPHOCYTES 5 (L) 13 - 44 %    MONOCYTES 6 4.0 - 12.0 %    EOSINOPHILS 0 (L) 0.5 - 7.8 %    BASOPHILS 0 0.0 - 2.0 %    IMMATURE GRANULOCYTES 1 0.0 - 5.0 %    ABS. NEUTROPHILS 14.4 (H) 1.7 - 8.2 K/UL    ABS. LYMPHOCYTES 0.8 0.5 - 4.6 K/UL    ABS. MONOCYTES 1.0 0.1 - 1.3 K/UL    ABS. EOSINOPHILS 0.0 0.0 - 0.8 K/UL    ABS. BASOPHILS 0.0 0.0 - 0.2 K/UL    ABS. IMM. GRANS. 0.1 0.0 - 0.5 K/UL   METABOLIC PANEL, COMPREHENSIVE    Collection Time: 02/25/19 12:12 PM   Result Value Ref Range    Sodium 142 136 - 145 mmol/L    Potassium 3.6 3.5 - 5.1 mmol/L    Chloride 109 (H) 98 - 107 mmol/L    CO2 26 21 - 32 mmol/L    Anion gap 7 7 - 16 mmol/L    Glucose 125 (H) 65 - 100 mg/dL    BUN 12 6 - 23 MG/DL    Creatinine 0.58 (L) 0.6 - 1.0 MG/DL    GFR est AA >60 >60 ml/min/1.73m2    GFR est non-AA >60 >60 ml/min/1.73m2    Calcium 8.6 8.3 - 10.4 MG/DL    Bilirubin, total 0.4 0.2 - 1.1 MG/DL    ALT (SGPT) 38 12 - 65 U/L    AST (SGOT) 21 15 - 37 U/L    Alk.  phosphatase 58 50 - 136 U/L    Protein, total 6.6 6.3 - 8.2 g/dL    Albumin 3.4 (L) 3.5 - 5.0 g/dL    Globulin 3.2 2.3 - 3.5 g/dL    A-G Ratio 1.1 (L) 1.2 - 3.5     MAGNESIUM    Collection Time: 02/25/19 12:12 PM   Result Value Ref Range    Magnesium 2.1 1.8 - 2.4 mg/dL   PTT    Collection Time: 02/25/19 12:12 PM   Result Value Ref Range    aPTT 27.8 24.7 - 39.8 SEC   FIBRINOGEN    Collection Time: 02/25/19 12:12 PM   Result Value Ref Range    Fibrinogen 258 190 - 501 mg/dL         Physical Exam:  General - Well developed, well nourished, in no apparent distress. Pleasant and conversent. HEENT - Normocephalic, atraumatic. Conjunctiva are clear. Neck - Supple without masses  Extremities - Peripheral pulses intact. No edema and no rashes. Neurological examination - Comprehension, attention, memory and reasoning are intact. Language and speech are normal.   On cranial nerve examination, (II, III, IV, VI) pupils are equal, round, and reactive to light. Extraocular motility is normal. (V, VII) Face is symmetric and sensation is intact to light touch. (VIII) Hearing is intact. (IX, X) Palate and uvula elevate symmetrically. Voice is normal. (XI) Shoulder shrug strong and equal bilaterally. (XII)Tongue is midline. Motor examination - There is normal muscle tone and bulk. Power is full throughout. Muscle stretch reflexes are diffusely hyperactive, with clonus at bilateral patella and bilateral achilles. Positive cardoso-tromner bilaterally L>R. Plantar response is extensor. Signed By: Royce Rueda NP     February 25, 2019      Discussed the underlying etiology with the patient at length. Differential additionally clearly includes an initial event of multiple sclerosis. CSF total protein is low oligoclonal banding is positive. Probability of neoplasm is relatively remote went over with patient dating of her most recent mammography and pelvic examination. I would be reluctant at this point in terms of a malignancy screen to exposure to radiation of neck chest abdomen and pelvis which would be substantial.  I reviewed the MRI of the thoracic spine which includes reasonable views of the lung fields. Appropriate to start with an ultrasound of the abdomen and pelvis    I have considered the issue of the patient's Hashimoto's disease.     Hashimoto's is not as far as I am aware associated with the transverse myelopathy although which is associated with focal neurologic phenomena as part of encephalopathy. Characteristic MRI findings are not present.   Elevation of oligoclonal bands however can be seen in this disorder and this may therefore be a red herring from the standpoint of multiple sclerosis    From a treatment standpoint quite clearly needs plasma exchange at this juncture and consideration long-term would be periodic IVIG, rituximab,

## 2019-02-25 NOTE — PROCEDURES
Department of Interventional Radiology  [phone]        Interventional Radiology Brief Procedure Note    Patient: Tarun Rankin MRN: [id number]  SSN: [id number]    YOB: 1975  Age: 37 y.o.   Sex: female      Date of Procedure: 2/25/2019    Pre-Procedure Diagnosis: myelitis    Post-Procedure Diagnosis: SAME    Procedure(s): Temporary Central Venous Catheter    Brief Description of Procedure: US, fluoro guided right IJ temporary apheresis catheter placed    Performed By: Otf Pillai PA-C     Assistants: None    Anesthesia:Lidocaine    Estimated Blood Loss: None    Specimens:  None    Implants:  Temporary Apheresis Catheter    Findings: catheter tip in right atrium     Complications: None    Recommendations: ok to use cathetere     Follow Up: referring MD    Signed By: Otf Pillai PA-C     February 25, 2019

## 2019-02-25 NOTE — CONSULTS
[facility]        Inpatient Hematology / Oncology Consult Note    Reason for Consult:  Myelitis [facility]) [V06.64]  Referring Physician:  Christiana Kemp MD    History of Present Illness:  Ms. Alexa Pereira is a 37 y.o. female admitted on 2/23/2019. The encounter diagnosis was Myelitis ([address] Her PMH includes hashimotos thyroiditis. She was recently admitted from 1/26 - 1/31 for what was felt to be new MS flare. Had neuro w/u with normal NCV, MRI C-s[ine with focal T2 abnormality at C5, MRI brain with R frontal centrum ovale lesion, and LP with elevated VZV Ab. Was treated with acyclovir and steroids. She returned on 2/23 with recurrent LE and UE paresthesia. F/u MRI c-spine with focal C5 lesion. She was admitted for ongoing myelitis. LP performed 2/23. We were consulted for apheresis. Review of Systems:  Constitutional Denies fever, chills, weight loss, appetite changes, fatigue, night sweats. HEENT Denies trauma, blurry vision, hearing loss, ear pain, nosebleeds, sore throat, neck pain and ear discharge. Skin Denies lesions or rashes. Lungs Denies dyspnea, cough, sputum production or hemoptysis. Cardiovascular Denies chest pain, palpitations, or lower extremity edema. Gastrointestinal Denies nausea, vomiting, changes in bowel habits, bloody or black stools, abdominal pain.  Denies dysuria, frequency or hesitancy of urination. Neuro +UE/LE paresthesia. Denies headaches, visual changes or ataxia. Denies dizziness, tingling, tremors, sensory change, speech change. Hematology Denies easy bruising or bleeding, denies gingival bleeding or epistaxis. Endo +Hashimoto's thyroiditis. Denies heat/cold intolerance, denies diabetes. MSK Denies back pain, arthralgias, myalgias or frequent falls. Psychiatric/Behavioral Denies depression and substance abuse. The patient is not nervous/anxious.          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 Family History   Problem Relation Age of Onset   Carlos MS Mother     Diabetes Father         pre-diabetes    Thyroid Cancer Father     Thyroid Disease Sister         hypothyroidism    Liver Disease Brother         autoimmune hepatitis, had liver transplant    Thyroid Disease Brother         hypothyroidism    Thyroid Disease Sister         hypothyroidism    Heart Disease Brother         tachycardia    Breast Cancer Paternal Aunt     Heart Disease Maternal Aunt     Thyroid Disease Maternal Aunt         hyperthyroidism    Thyroid Disease Maternal Grandmother         hypothyroidism    Thyroid Disease Cousin         hypothyroidism     Social History     Socioeconomic History    Marital status:      Spouse name: Edison Lanes Number of children: 3    Years of education: Not on file    Highest education level: Not on file   Social Needs    Financial resource strain: Not on file    Food insecurity - worry: Not on file    Food insecurity - inability: Not on file   Harir needs - medical: Not on file   Harir needs - non-medical: Not on file   Occupational History    Occupation: stay at home mom     Employer: NOT EMPLOYED   Tobacco Use    Smoking status: Never Smoker    Smokeless tobacco: Never Used   Substance and Sexual Activity    Alcohol use: Yes     Alcohol/week: 1.8 oz     Types: 3 Standard drinks or equivalent per week     Comment: occ    Drug use: No    Sexual activity: Yes     Partners: Male     Birth control/protection: Surgical   Other Topics Concern    Not on file   Social History Narrative    Chidren: Reginaldo Marx (12/25/98), Kostas (2/21/06), Elliot (9/23/09).   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     Current Facility-Administered Medications   Medication Dose Route Frequency Provider Last Rate Last Dose  gabapentin (NEURONTIN) capsule 100 mg  100 mg Oral TID Fuad Murillo MD   100 mg at 19 0852    sertraline (ZOLOFT) tablet 50 mg  50 mg Oral DAILY Fuad Murillo MD   50 mg at 19 0852    sodium chloride (NS) flush 5-40 mL  5-40 mL IntraVENous Q8H Hiren Parker MD   Stopped at 19 0600    sodium chloride (NS) flush 5-40 mL  5-40 mL IntraVENous PRN Fuad Murillo MD        acyclovir (ZOVIRAX) 550 mg in 0.9% sodium chloride 100 mL IVPB  550 mg IntraVENous Q8H Fuad Murillo MD   550 mg at 19 0958    methylPREDNISolone (PF) (Solu-MEDROL) 500 mg in 0.9% sodium chloride 100 mL IVPB  500 mg IntraVENous Q12H Zo Murillo  mL/hr at 19 2210 500 mg at 19 2210       OBJECTIVE:  Patient Vitals for the past 8 hrs:   BP Temp Pulse Resp SpO2   19 0739 102/67 98.6 °F (37 °C) 65 20 98 %   19 0318 95/58 98.1 °F (36.7 °C) 70 18 97 %     Temp (24hrs), Av.3 °F (36.8 °C), Min:98 °F (36.7 °C), Max:98.6 °F (37 °C)    No intake/output data recorded. Physical Exam:  Constitutional: Well developed, well nourished female in no acute distress, sitting comfortably in the hospital bed. HEENT: Normocephalic and atraumatic. Oropharynx is clear, mucous membranes are moist.  Extraocular muscles are intact. Sclerae anicteric. Neck supple without JVD. No thyromegaly present. Lymph node   Deferred   Skin Warm and dry. No bruising and no rash noted. No erythema. No pallor. Respiratory Lungs are clear to auscultation bilaterally without wheezes, rales or rhonchi, normal air exchange without accessory muscle use. CVS Normal rate, regular rhythm and normal S1 and S2. No murmurs, gallops, or rubs. Abdomen Soft, nontender and nondistended, normoactive bowel sounds. No palpable mass. No hepatosplenomegaly. Neuro Grossly nonfocal with no obvious sensory or motor deficits. BUE/BLE strength 5/5.    MSK Normal range of motion in general.  No edema and no tenderness. Psych Appropriate mood and affect. Labs:    No results found for this or any previous visit (from the past 24 hour(s)). Imaging:  MRI CERVICAL SPINE WITHOUT CONTRAST.     COMPARISON: Herpes zoster, cord signal abnormality and extremity weakness.     HISTORY: 26 January 2019.     TECHNIQUE: Sagittal T1 and T2-weighted images, axial T2 and gradient-echo  images.        FINDINGS: The focal cord signal abnormality centered at the C5 level remains. There is now distinct contrast enhancement following gadolinium, best seen on  the axial images. No new cord lesions.     Spinal alignment anatomic. Included posterior fossa structures unremarkable. Bone marrow signal intensity within normal limits.      C2-3: No focal disc herniation or stenosis. C3-4: No focal disc herniation or stenosis. C4-5: No focal disc herniation or stenosis. C5-6: No focal disc herniation or stenosis. C6-7: No focal disc herniation or stenosis. C7-T1: No focal disc herniation or stenosis.     IMPRESSION  IMPRESSION: Focal cord signal abnormality at the C5 level appears similar in  size. There is now clearly contrast enhancement following gadolinium, better  seen on the axial images. No contrast-enhancement appreciated on the prior  study. No new cord lesions. Differential diagnosis remains the same, transverse  myelitis versus demyelination versus neoplasia.     ASSESSMENT:  Problem List  Date Reviewed: 1/28/2019          Codes Class Noted    * (Principal) Myelitis ([facility] ICD-10-CM: W29.92  ICD-9-CM: 323.9  2/23/2019        History of shingles ICD-10-CM: Z86.19  ICD-9-CM: V12.09  1/29/2019        Myelitis due to herpes zoster ([facility] ICD-10-CM: B02.24  ICD-9-CM: 053.14  1/26/2019        Hyperreflexia ICD-10-CM: R29.2  ICD-9-CM: 796.1  1/24/2019        Paresthesia of both legs ICD-10-CM: R20.2  ICD-9-CM: 782.0  1/24/2019        Pain in both lower extremities ICD-10-CM: M79.604, M79.605  ICD-9-CM: 729.5  1/24/2019        Hashimoto's thyroiditis ICD-10-CM: E06.3  ICD-9-CM: 388. 2  Unknown        Goiter ICD-10-CM: E04.9  ICD-9-CM: 240.9  6/18/2018        Family history of thyroid cancer ICD-10-CM: Z80.8  ICD-9-CM: V16.8  6/18/2018                RECOMMENDATIONS:  Myelitis  - On ACV, Solumedrol  - Per neuro, apheresis every other day x 5 treatments - coordinator contacted to begin today  - IR consulted for temp pheresis line  - Labs ordered per treatment plan    Lab studies and imaging studies were personally reviewed. Thank you for allowing us to participate in the care of Ms. Iqbal. Feli Singh NP   [facility]ematology [facility] Oncology  [address] [place] [address]  Office : [phone]  Fax : [phone]       Attending Addendum:  I have personally performed a face to face diagnostic evaluation on this patient. I have reviewed and agree with the care plan as documented above by Feli Singh N.P.  My findings are as follows: Patient appears lethargic, heart rate regular without murmurs, abdomen is non-tender, bowel sounds are positive. Middle aged lady admitted w myelitis, and recommended to undergo plasmapheresis per neurology. Central line placed. Procedure discussed in detail w/ patient. Will plan to initiate every other day x 5 per neuro rec's. Closely monitor for tolerance. Thank you for allowing us to participate in care of this pleasant patient. Please call w any questions.         Melanee Gosselin, MD  [facility] Hematology/Oncology  [address] [place] [address]  Office : [phone]  Fax : [phone]

## 2019-02-25 NOTE — PROGRESS NOTES
Infectious Disease Progress Note    Today's Date: 2019   Admit Date: 2019    Impression:   · Cervical myelitis  · Recent cutaneous zoster: no evidence of recurrence since 2018 episode. CSF from most recent LP  unremarkable and symptoms are so transient which does not fit a viral meningoencephalitis        Plan:   · Discontinue IV Acyclovir; start valacylovir 1 gm po q24h as secondary prophylaxis  · Continue corticosteroids  · Send CSF for repeat testing for VZV PCR    Anti-infectives:   · Acyclovir 550 mg IV q 8 hours    Subjective:   Date of Consultation:  2019  Referring Physician: Lane Velásquez    Had right IJ phoresis catheter placed. Still with intermittent shooting episodes in both LE; very transient. No Known Allergies     Review of Systems:  A comprehensive review of systems was negative except for that written in the History of Present Illness. Objective:     Visit Vitals  /70 (BP 1 Location: Right arm, BP Patient Position: At rest)   Pulse (!) 59   Temp 98.1 °F (36.7 °C)   Resp 18   Wt 70.8 kg (156 lb)   SpO2 98%   BMI 26.78 kg/m²     Temp (24hrs), Av.2 °F (36.8 °C), Min:98 °F (36.7 °C), Max:98.6 °F (37 °C)       Lines:  Peripheral IV:       Physical Exam:    General:  Alert, cooperative, well noursished, well developed, appears stated age   Eyes:  Sclera anicteric. Pupils equally round and reactive to light. Mouth/Throat: Mucous membranes normal, oral pharynx clear   Neck: Supple   Lungs:   Clear to auscultation bilaterally, good effort   CV:  Regular rate and rhythm,no murmur, click, rub or gallop   Abdomen:   Soft, non-tender.  bowel sounds normal. non-distended   Extremities: No cyanosis or edema   Skin: Skin color, texture, turgor normal. no acute rash or lesions   Lymph nodes: Cervical and supraclavicular normal   Musculoskeletal: No swelling or deformity   Lines/Devices:  Intact, no erythema, drainage or tenderness   Psych:  Neurologic:  Alert and oriented, normal mood affect given the setting  Intact strength in all extremities       Data Review:     CBC:  Recent Labs     02/25/19  1212 02/23/19  1047   WBC 16.4* 5.0   GRANS 88* 53   MONOS 6 12   EOS 0* 1   ANEU 14.4* 2.7   ABL 0.8 1.7   HGB 12.7 12.6   HCT 38.4 38.2    258       BMP:  Recent Labs     02/25/19  1212 02/23/19  1047   CREA 0.58* 0.62   BUN 12 13    140   K 3.6 3.3*   * 106   CO2 26 25   AGAP 7 9   * 106*       LFTS:  Recent Labs     02/25/19  1212 02/23/19  1047   TBILI 0.4 0.6   ALT 38 28   SGOT 21 16   AP 58 55   TP 6.6 6.4   ALB 3.4* 3.4*       Microbiology:     All Micro Results     Procedure Component Value Units Date/Time    TUBE 2 - CULTURE WITH Ilda Wang, CSF [[id number]] Collected:  02/23/19 1128    Order Status:  Completed Specimen:  Cerebrospinal Fluid Updated:  02/25/19 1104     Special Requests: TUBE 2, CULTURE AND SENSITIVTY AND GRAM STAIN. GRAM STAIN NO WBCS SEEN         NO DEFINITE ORGANISM SEEN        Culture result: NO GROWTH 2 DAYS       MENINGITIS PATHOGENS PANEL (BY PCR), CSF [[id number]] Collected:  02/23/19 1128    Order Status:  Completed Specimen:  Cerebrospinal Fluid Updated:  02/24/19 0626     Meningitis panel       RESULTS SCANNED IN [facility]          HSV 1 AND 2 PCR [[id number]] Collected:  02/23/19 1047    Order Status:  Completed Specimen:  Other from Whole Blood Updated:  02/23/19 1237     Specimen source BLOOD        HSV 1 PENDING     HSV 2 PENDING    TUBE 1 - HOLD CSF [[id number]]     Order Status:  Sent Specimen:  Cerebrospinal Fluid     CMV BY PCR, QT, BAL [[id number]]     Order Status:  Sent           Imaging:   MRI C spine (2/21/19)  IMPRESSION: Focal cord signal abnormality at the C5 level appears similar in  size. There is now clearly contrast enhancement following gadolinium, better  seen on the axial images. No contrast-enhancement appreciated on the prior  study. No new cord lesions.  Differential diagnosis remains the same, transverse  myelitis versus demyelination versus neoplasia.     Signed By: Jordi Rodriguez MD     February 25, 2019

## 2019-02-25 NOTE — INTERDISCIPLINARY ROUNDS
Interdisciplinary team rounds were held 2/25/2019 with the following team members:Care Management, Nursing and Occupational Therapy and the patient. Plan of care discussed. See clinical pathway and/or care plan for interventions and desired outcomes.

## 2019-02-26 LAB
ALBUMIN SERPL-MCNC: 4.3 G/DL (ref 3.5–5)
ALBUMIN/GLOB SERPL: 2.5 {RATIO} (ref 1.2–3.5)
ALP SERPL-CCNC: 39 U/L (ref 50–136)
ALT SERPL-CCNC: 23 U/L (ref 12–65)
ANION GAP SERPL CALC-SCNC: 8 MMOL/L (ref 7–16)
AST SERPL-CCNC: 12 U/L (ref 15–37)
BILIRUB SERPL-MCNC: 0.6 MG/DL (ref 0.2–1.1)
BUN SERPL-MCNC: 14 MG/DL (ref 6–23)
CALCIUM SERPL-MCNC: 8.5 MG/DL (ref 8.3–10.4)
CHLORIDE SERPL-SCNC: 108 MMOL/L (ref 98–107)
CO2 SERPL-SCNC: 26 MMOL/L (ref 21–32)
CREAT SERPL-MCNC: 0.78 MG/DL (ref 0.6–1)
GLOBULIN SER CALC-MCNC: 1.7 G/DL (ref 2.3–3.5)
GLUCOSE SERPL-MCNC: 188 MG/DL (ref 65–100)
MAGNESIUM SERPL-MCNC: 2.2 MG/DL (ref 1.8–2.4)
POTASSIUM SERPL-SCNC: 3.8 MMOL/L (ref 3.5–5.1)
PROT SERPL-MCNC: 6 G/DL (ref 6.3–8.2)
SODIUM SERPL-SCNC: 142 MMOL/L (ref 136–145)

## 2019-02-26 PROCEDURE — 74011250636 HC RX REV CODE- 250/636: Performed by: INTERNAL MEDICINE

## 2019-02-26 PROCEDURE — 36415 COLL VENOUS BLD VENIPUNCTURE: CPT

## 2019-02-26 PROCEDURE — 65270000029 HC RM PRIVATE

## 2019-02-26 PROCEDURE — 83735 ASSAY OF MAGNESIUM: CPT

## 2019-02-26 PROCEDURE — 99232 SBSQ HOSP IP/OBS MODERATE 35: CPT | Performed by: PSYCHIATRY & NEUROLOGY

## 2019-02-26 PROCEDURE — 6A551Z3 PHERESIS OF PLASMA, MULTIPLE: ICD-10-PCS | Performed by: INTERNAL MEDICINE

## 2019-02-26 PROCEDURE — 99232 SBSQ HOSP IP/OBS MODERATE 35: CPT | Performed by: INTERNAL MEDICINE

## 2019-02-26 PROCEDURE — 74011250637 HC RX REV CODE- 250/637: Performed by: INTERNAL MEDICINE

## 2019-02-26 PROCEDURE — 74011000258 HC RX REV CODE- 258: Performed by: INTERNAL MEDICINE

## 2019-02-26 PROCEDURE — 80053 COMPREHEN METABOLIC PANEL: CPT

## 2019-02-26 RX ADMIN — Medication 10 ML: at 05:55

## 2019-02-26 RX ADMIN — SERTRALINE HYDROCHLORIDE 50 MG: 50 TABLET ORAL at 07:53

## 2019-02-26 RX ADMIN — Medication 10 ML: at 21:13

## 2019-02-26 RX ADMIN — GABAPENTIN 100 MG: 100 CAPSULE ORAL at 21:13

## 2019-02-26 RX ADMIN — Medication 10 ML: at 14:00

## 2019-02-26 RX ADMIN — GABAPENTIN 100 MG: 100 CAPSULE ORAL at 07:53

## 2019-02-26 RX ADMIN — SODIUM CHLORIDE 500 MG: 900 INJECTION, SOLUTION INTRAVENOUS at 11:01

## 2019-02-26 RX ADMIN — GABAPENTIN 100 MG: 100 CAPSULE ORAL at 16:39

## 2019-02-26 RX ADMIN — VALACYCLOVIR HYDROCHLORIDE 1000 MG: 500 TABLET, FILM COATED ORAL at 07:53

## 2019-02-26 RX ADMIN — SODIUM CHLORIDE 500 MG: 900 INJECTION, SOLUTION INTRAVENOUS at 21:29

## 2019-02-26 NOTE — PROGRESS NOTES
Problem: Interdisciplinary Rounds  Goal: Interdisciplinary Rounds  Outcome: Progressing Towards Goal  Interdisciplinary team rounds were held 2/26/2019 with the following team members:Care Management, Nursing, Occupational Therapy, Physician and . Plan of care discussed. See clinical pathway and/or care plan for interventions and desired outcomes.

## 2019-02-26 NOTE — PROGRESS NOTES
Problem: Falls - Risk of  Goal: *Absence of Falls  Document Dheeraj Fall Risk and appropriate interventions in the flowsheet.   Fall Risk Interventions:  Mobility Interventions: Bed/chair exit alarm         Medication Interventions: Bed/chair exit alarm

## 2019-02-26 NOTE — PROGRESS NOTES
Team discussed patient's case during IDT rounds on this date. Patient will require 10 days of plasmapheresis before she will be ready to DC. Anticipate she will be able to discharge to home when medically ready. CM will continue to follow case to assist with any needs as they arise.      Care Management Interventions  Transition of Care Consult (CM Consult): Discharge Planning  Current Support Network: Own Home  Confirm Follow Up Transport: Family  Discharge Location  Discharge Placement: Home

## 2019-02-26 NOTE — PROGRESS NOTES
Hospitalist Progress Note     Admit Date:  2019  8:50 AM   Name:  Sixto Guerrero   Age:  37 y.o.  :  1975   MRN:  [id number]   PCP:  Brad Hernandez MD  Treatment Team: Attending Provider: Cristiano Howard MD; Consulting Provider: Charlie Darnell MD; Consulting Provider: Vijay Jesus MD; Utilization Review: Evens Jacinto RN; Consulting Provider: Evonne Tang MD    HPI/Subjective:      Ms. Carin Donnelly is a 36 yo female with PMH of varicella zoster, hashimotos thyroiditis and admit  for what was felt to be a new multiple sclerosis flare. She began with complaints of LE paresthesia. She had outpatient neurology workup with normal NCV. MRI cspine showed focal T2 abnormality at C5. MRI brain showed right frontal centrum ovale lesion. She had LP that showed elevated VZV Ab and was managed with acyclovir. She was also managed with IV steroids. She was discharged home with outpatient neurology followup and began to have recurrent LE and right UE paresthesia. She was re-evaluated by neurology with followup MRI cspine showing increase of the focal C5 lesion. She has been referred to direct admit for concern for ongoing myelitis. She is s/p repeat LP, pending viral PCR, but negative bacterial  cultures. She has been restarted on IV steroids and acyclovir--> oral valtrex . Neuro and ID following. It is recommended to have hematology consult for apheresis. IR placed apheresis line on 19 and she will have 5 treatments over 10 days, EOT 3-6-19. Also pelvic US showed right ovarian cyst and ABD US negative. She is up to date with age related cancer screenings/ has pending outpatient GYN followup.        19 fatigued today, ate ok, still some paresthesia, has right LE weakness     Objective:     Patient Vitals for the past 24 hrs:   Temp Pulse Resp BP SpO2   19 1053 98.5 °F (36.9 °C) (!) 57 18 100/62 98 %   19 0731 98.3 °F (36.8 °C) (!) 54 18 110/72 97 % 02/26/19 0427 98.3 °F (36.8 °C) 61 20 97/60 97 %   02/26/19 0044 98.3 °F (36.8 °C) (!) 55 18 102/67 97 %   02/25/19 2146 98.2 °F (36.8 °C) 66 18 94/57 97 %   02/25/19 2038 -- 70 20 97/58 98 %   02/25/19 2013 -- 80 20 91/59 97 %     Oxygen Therapy  O2 Sat (%): 98 % (02/26/19 1053)  O2 Device: Room air (02/25/19 1155)    Intake/Output Summary (Last 24 hours) at 2/26/2019 1536  Last data filed at 2/25/2019 2100  Gross per 24 hour   Intake 365 ml   Output 2998 ml   Net -2633 ml       *Note that automatically entered I/Os may not be accurate; dependent on patient compliance with collection and accurate  by techs. General:    Well nourished. Alert. No distress   CV:   RRR. No murmur, rub, or gallop. No cyanosis or edema. Lungs:   CTAB. No wheezing, rhonchi, or rales. Extremities: Warm and dry. Skin:     No rashes or jaundice. Neuro:  No gross focal deficits    Data Review:  I have reviewed all labs, meds, and studies from the last 24 hours:    Recent Results (from the past 24 hour(s))   METABOLIC PANEL, COMPREHENSIVE    Collection Time: 02/26/19 12:49 PM   Result Value Ref Range    Sodium 142 136 - 145 mmol/L    Potassium 3.8 3.5 - 5.1 mmol/L    Chloride 108 (H) 98 - 107 mmol/L    CO2 26 21 - 32 mmol/L    Anion gap 8 7 - 16 mmol/L    Glucose 188 (H) 65 - 100 mg/dL    BUN 14 6 - 23 MG/DL    Creatinine 0.78 0.6 - 1.0 MG/DL    GFR est AA >60 >60 ml/min/1.73m2    GFR est non-AA >60 >60 ml/min/1.73m2    Calcium 8.5 8.3 - 10.4 MG/DL    Bilirubin, total 0.6 0.2 - 1.1 MG/DL    ALT (SGPT) 23 12 - 65 U/L    AST (SGOT) 12 (L) 15 - 37 U/L    Alk.  phosphatase 39 (L) 50 - 136 U/L    Protein, total 6.0 (L) 6.3 - 8.2 g/dL    Albumin 4.3 3.5 - 5.0 g/dL    Globulin 1.7 (L) 2.3 - 3.5 g/dL    A-G Ratio 2.5 1.2 - 3.5     MAGNESIUM    Collection Time: 02/26/19 12:49 PM   Result Value Ref Range    Magnesium 2.2 1.8 - 2.4 mg/dL        All Micro Results     Procedure Component Value Units Date/Time    TUBE 2 - CULTURE WITH [place], CSF [[id number]] Collected:  02/23/19 1128    Order Status:  Completed Specimen:  Cerebrospinal Fluid Updated:  02/26/19 0651     Special Requests: TUBE 2, CULTURE AND SENSITIVTY AND GRAM STAIN. GRAM STAIN NO WBCS SEEN         NO DEFINITE ORGANISM SEEN        Culture result: NO GROWTH 3 DAYS       MENINGITIS PATHOGENS PANEL (BY PCR), CSF [[id number]] Collected:  02/23/19 1128    Order Status:  Completed Specimen:  Cerebrospinal Fluid Updated:  02/24/19 0626     Meningitis panel       RESULTS SCANNED IN [facility]          HSV 1 AND 2 PCR [[id number]] Collected:  02/23/19 1047    Order Status:  Completed Specimen:  Other from Whole Blood Updated:  02/23/19 1237     Specimen source BLOOD        HSV 1 PENDING     HSV 2 PENDING    TUBE 1 - HOLD CSF [[id number]]     Order Status:  Sent Specimen:  Cerebrospinal Fluid     CMV BY PCR, QT, BAL [[id number]]     Order Status:  Sent           No results found for this visit on 02/23/19. Current Meds:  Current Facility-Administered Medications   Medication Dose Route Frequency    sodium chloride (NS) flush 10-40 mL  10-40 mL IntraVENous PRN    valACYclovir (VALTREX) tablet 1,000 mg  1,000 mg Oral DAILY    gabapentin (NEURONTIN) capsule 100 mg  100 mg Oral TID    sertraline (ZOLOFT) tablet 50 mg  50 mg Oral DAILY    sodium chloride (NS) flush 5-40 mL  5-40 mL IntraVENous Q8H    sodium chloride (NS) flush 5-40 mL  5-40 mL IntraVENous PRN    methylPREDNISolone (PF) (Solu-MEDROL) 500 mg in 0.9% sodium chloride 100 mL IVPB  500 mg IntraVENous Q12H       Other Studies (last 24 hours):  Us Abd Comp    Result Date: 2/25/2019  Abdominal Ultrasound INDICATION:  rule out malignancy. Spinal cord lesion. FINDINGS: There are no discrete lesions in the visualized portions of the liver, pancreas, or spleen. There is no bile duct dilatation. The common bile duct measures 5 mm. The gallbladder is normal in size and appearance. No gallstones are seen.  There is no wall thickening. The right kidney measures 12.2 cm in length. The left kidney measures 12.0 cm. There is no hydronephrosis. There is no evidence of a renal mass. There is no ascites. The aorta and inferior vena cava are normal in caliber. IMPRESSION: Unremarkable abdominal ultrasound     Us Pelv Non Ob W Tv    Result Date: 2/26/2019  HISTORY: Spinal cord lesion. EXAM: Ultrasound pelvis TECHNIQUE: Real-time grayscale and color Doppler imaging is performed in the longitudinal and transverse planes. Both the transabdominal and transvaginal approaches are utilized. FINDINGS: Transabdominal: The uterus measures 8.9 cm with an endometrial stripe measuring 9 mm. No definite pelvic mass seen. Transvaginal: The right ovary measures 3.9 x 2.1 x 2.17 m with a complex cyst measuring 2 cm. The left ovary measures 3.7 x 1.8 x 2.5 cm. Trace free fluid noted within the cul-de-sac. IMPRESSION: 1. Complex cyst of the right ovary measuring 2 cm. Follow-up in 6 weeks recommended, a different point in the patient's cycle. 2. Trace pelvic free fluid. Assessment and Plan:     Hospital Problems as of 2/26/2019 Date Reviewed: 1/28/2019          Codes Class Noted - Resolved POA    * (Principal) [facility] ([facility] ICD-10-CM: G04.91  ICD-9-CM: 323.9  2/23/2019 - Present Yes        History of shingles ICD-10-CM: Z86.19  ICD-9-CM: V12.09  1/29/2019 - Present Yes              Plan:    · Myelitis: pending LP studies for  HSV/EBV/CMV/VZV PCR and serum IgG subclass, continue solumedrol 500 mg IV every 12 hours and changed to oral valtrex. Appreciate neuro, heme and ID,  apheresis EOT 3-6-19.   continue home neurontin and zoloft, PT/OT consults  · Right ovarian cyst: outpatient GYN followup     Discharge planning:  Pending course to home  DVT ppx: SCD  Code status:  Full  Estimated LOS:  Greater than 2 midnights  Risk:  high  Care plan:  Jan, [phone]  Signed:  Davida Cook MD

## 2019-02-26 NOTE — PROGRESS NOTES
Problem: Falls - Risk of  Goal: *Absence of Falls  Document Dheeraj Fall Risk and appropriate interventions in the flowsheet. Outcome: Progressing Towards Goal  Fall Risk Interventions:  Mobility Interventions: Bed/chair exit alarm         Medication Interventions: Teach patient to arise slowly     Patient remains free from falls at this time. Patient is independent in her room.

## 2019-02-26 NOTE — PROGRESS NOTES
Infectious Disease Progress Note    Today's Date: 2019   Admit Date: 2019    Impression:   · Cervical myelitis  · Recent cutaneous zoster: no evidence of recurrence since 2018 episode. CSF from most recent LP  unremarkable and symptoms are so transient which does not fit a viral meningoencephalitis        Plan:   · Continue valacylovir 1 gm po q24h as secondary prophylaxis. Duration 3-6 months  · Continue corticosteroids  · ID appt:  at 1040AM.   · **Will sign off at this time. Please call with questions or concerns. Anti-infectives:   · Acyclovir 550 mg IV q 8 hours--Valtrex     Subjective:   Afebrile. Tolerated first session of pheresis. Still with tingling/numbness to LUE/LLE      No Known Allergies     Review of Systems:  A comprehensive review of systems was negative except for that written in the History of Present Illness. Objective:     Visit Vitals  /62 (BP 1 Location: Right arm, BP Patient Position: At rest)   Pulse (!) 57   Temp 98.5 °F (36.9 °C)   Resp 18   Wt 77.6 kg (171 lb)   SpO2 98%   BMI 29.35 kg/m²     Temp (24hrs), Av.4 °F (36.9 °C), Min:98.2 °F (36.8 °C), Max:98.8 °F (37.1 °C)       Lines:  Peripheral IV:   R IJ pheresis catheter    Physical Exam:    General:  Alert, cooperative, well noursished, well developed, appears stated age   Eyes:  Sclera anicteric. Pupils equally round and reactive to light. Mouth/Throat: Mucous membranes normal, oral pharynx clear   Neck: Supple   Lungs:   Clear to auscultation bilaterally, good effort   CV:  Regular rate and rhythm,no murmur, click, rub or gallop   Abdomen:   Soft, non-tender.  bowel sounds normal. non-distended   Extremities: No cyanosis or edema   Skin: Skin color, texture, turgor normal. no acute rash or lesions   Lymph nodes: Cervical and supraclavicular normal   Musculoskeletal: No swelling or deformity   Lines/Devices:  Intact, no erythema, drainage or tenderness   Psych:  Neurologic:  Alert and oriented, normal mood affect given the setting  Intact strength in all extremities       Data Review:     CBC:  Recent Labs     02/25/19  1212   WBC 16.4*   GRANS 88*   MONOS 6   EOS 0*   ANEU 14.4*   ABL 0.8   HGB 12.7   HCT 38.4          BMP:  Recent Labs     02/25/19  1212   CREA 0.58*   BUN 12      K 3.6   *   CO2 26   AGAP 7   *       LFTS:  Recent Labs     02/25/19  1212   TBILI 0.4   ALT 38   SGOT 21   AP 58   TP 6.6   ALB 3.4*       Microbiology:     All Micro Results     Procedure Component Value Units Date/Time    TUBE 2 - CULTURE WITH Arloa Slipper, CSF [[id number]] Collected:  02/23/19 1128    Order Status:  Completed Specimen:  Cerebrospinal Fluid Updated:  02/26/19 0651     Special Requests: TUBE 2, CULTURE AND SENSITIVTY AND GRAM STAIN. GRAM STAIN NO WBCS SEEN         NO DEFINITE ORGANISM SEEN        Culture result: NO GROWTH 3 DAYS       MENINGITIS PATHOGENS PANEL (BY PCR), CSF [[id number]] Collected:  02/23/19 1128    Order Status:  Completed Specimen:  Cerebrospinal Fluid Updated:  02/24/19 0626     Meningitis panel       RESULTS SCANNED IN [facility]          HSV 1 AND 2 PCR [[id number]] Collected:  02/23/19 1047    Order Status:  Completed Specimen:  Other from Whole Blood Updated:  02/23/19 1237     Specimen source BLOOD        HSV 1 PENDING     HSV 2 PENDING    TUBE 1 - HOLD CSF [[id number]]     Order Status:  Sent Specimen:  Cerebrospinal Fluid     CMV BY PCR, QT, BAL [[id number]]     Order Status:  Sent           Imaging:   MRI C spine (2/21/19)  IMPRESSION: Focal cord signal abnormality at the C5 level appears similar in  size. There is now clearly contrast enhancement following gadolinium, better  seen on the axial images. No contrast-enhancement appreciated on the prior  study. No new cord lesions. Differential diagnosis remains the same, transverse  myelitis versus demyelination versus neoplasia.     Signed By: Ulysses Decree, NP     February 26, 2019

## 2019-02-26 NOTE — PROGRESS NOTES
[facility]ematology & Oncology        Inpatient Hematology / Oncology Daily Note    Reason for Consult:  Myelitis [facility]) [K61.62]  Referring Physician:  Moreno Gray MD    24 Hour Events:  Afebrile, VSS  Rec'd first apheresis tx yesterday (1 of 5), next due tomorrow      ROS:  Constitutional: Negative for fever, chills. CV: Negative for chest pain, palpitations, edema. Respiratory: Negative for dyspnea, cough, wheezing. GI: Negative for nausea, abdominal pain, diarrhea. 10 point review of systems is otherwise negative with the exception of the elements mentioned above in the HPI.        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    positive on PAP    Multiple sclerosis ([facility] 01/2019    Rheumatoid arthritis ([facility]      Past Surgical History:   Procedure Laterality Date    IR INSERT NON TUNL CVC OVER 5 YRS  2/25/2019     Family History   Problem Relation Age of Onset    MS Mother     Diabetes Father         pre-diabetes    Thyroid Cancer Father     Thyroid Disease Sister         hypothyroidism    Liver Disease Brother         autoimmune hepatitis, had liver transplant    Thyroid Disease Brother         hypothyroidism    Thyroid Disease Sister         hypothyroidism    Heart Disease Brother         tachycardia    Breast Cancer Paternal Aunt     Heart Disease Maternal Aunt     Thyroid Disease Maternal Aunt         hyperthyroidism    Thyroid Disease Maternal Grandmother         hypothyroidism    Thyroid Disease Cousin         hypothyroidism     Social History     Socioeconomic History    Marital status:      Spouse name: Hayley Larson Number of children: 3    Years of education: Not on file    Highest education level: Not on file   Social Needs    Financial resource strain: Not on file    Food insecurity - worry: Not on file    Food insecurity - inability: Not on file    Transportation needs - medical: Not on file    Transportation needs - non-medical: Not on file   Occupational History    Occupation: stay at home mom     Employer: NOT EMPLOYED   Tobacco Use    Smoking status: Never Smoker    Smokeless tobacco: Never Used   Substance and Sexual Activity    Alcohol use: Yes     Alcohol/week: 1.8 oz     Types: 3 Standard drinks or equivalent per week     Comment: occ    Drug use: No    Sexual activity: Yes     Partners: Male     Birth control/protection: Surgical   Other Topics Concern    Not on file   Social History Narrative    Chidren: Jyothi Acuña (98), Kostas (06), Elliot (09).  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     Current Facility-Administered Medications   Medication Dose Route Frequency Provider Last Rate Last Dose    sodium chloride (NS) flush 10-40 mL  10-40 mL IntraVENous PRN Farooq Yeboah MD        valACYclovir (VALTREX) tablet 1,000 mg  1,000 mg Oral DAILY Victoria Pickard MD   1,000 mg at 19 0753    gabapentin (NEURONTIN) capsule 100 mg  100 mg Oral TID Davis-Pachter, Kem Apgar, MD   100 mg at 19 0753    sertraline (ZOLOFT) tablet 50 mg  50 mg Oral DAILY Talya Mir MD   50 mg at 19 0753    sodium chloride (NS) flush 5-40 mL  5-40 mL IntraVENous Q8H Talya Mir MD   10 mL at 19 0555    sodium chloride (NS) flush 5-40 mL  5-40 mL IntraVENous PRN Talya Mir MD        methylPREDNISolone (PF) (Solu-MEDROL) 500 mg in 0.9% sodium chloride 100 mL IVPB  500 mg IntraVENous Q12H Talya Mir  mL/hr at 19 1101 500 mg at 19 1101       OBJECTIVE:  Patient Vitals for the past 8 hrs:   BP Temp Pulse Resp SpO2   19 1053 100/62 98.5 °F (36.9 °C) (!) 57 18 98 %   19 0731 110/72 98.3 °F (36.8 °C) (!) 54 18 97 %   19 0427 97/60 98.3 °F (36.8 °C) 61 20 97 %     Temp (24hrs), Av.4 °F (36.9 °C), Min:98.1 °F (36.7 °C), Max:98.8 °F (37.1 °C)    No intake/output data recorded.     Physical Exam:  Constitutional: Well developed, well nourished female in no acute distress, sitting comfortably in the hospital bed. HEENT: Normocephalic and atraumatic. Oropharynx is clear, mucous membranes are moist.  Extraocular muscles are intact. Sclerae anicteric. Neck supple without JVD. No thyromegaly present. Lymph node   Deferred   Skin Warm and dry. No bruising and no rash noted. No erythema. No pallor. Respiratory Lungs are clear to auscultation bilaterally without wheezes, rales or rhonchi, normal air exchange without accessory muscle use. CVS Normal rate, regular rhythm and normal S1 and S2. No murmurs, gallops, or rubs. Abdomen Soft, nontender and nondistended, normoactive bowel sounds. No palpable mass. No hepatosplenomegaly. Neuro Grossly nonfocal with no obvious sensory or motor deficits. BUE/BLE strength 5/5. MSK Normal range of motion in general.  No edema and no tenderness. Psych Appropriate mood and affect. Labs:    Recent Results (from the past 24 hour(s))   CBC WITH AUTOMATED DIFF    Collection Time: 02/25/19 12:12 PM   Result Value Ref Range    WBC 16.4 (H) 4.3 - 11.1 K/uL    RBC 4.14 4.05 - 5.2 M/uL    HGB 12.7 11.7 - 15.4 g/dL    HCT 38.4 35.8 - 46.3 %    MCV 92.8 79.6 - 97.8 FL    MCH 30.7 26.1 - 32.9 PG    MCHC 33.1 31.4 - 35.0 g/dL    RDW 13.2 11.9 - 14.6 %    PLATELET 248 903 - 934 K/uL    MPV 10.6 9.4 - 12.3 FL    ABSOLUTE NRBC 0.00 0.0 - 0.2 K/uL    DF AUTOMATED      NEUTROPHILS 88 (H) 43 - 78 %    LYMPHOCYTES 5 (L) 13 - 44 %    MONOCYTES 6 4.0 - 12.0 %    EOSINOPHILS 0 (L) 0.5 - 7.8 %    BASOPHILS 0 0.0 - 2.0 %    IMMATURE GRANULOCYTES 1 0.0 - 5.0 %    ABS. NEUTROPHILS 14.4 (H) 1.7 - 8.2 K/UL    ABS. LYMPHOCYTES 0.8 0.5 - 4.6 K/UL    ABS. MONOCYTES 1.0 0.1 - 1.3 K/UL    ABS. EOSINOPHILS 0.0 0.0 - 0.8 K/UL    ABS. BASOPHILS 0.0 0.0 - 0.2 K/UL    ABS. IMM.  GRANS. 0.1 0.0 - 0.5 K/UL   METABOLIC PANEL, COMPREHENSIVE    Collection Time: 02/25/19 12:12 PM Result Value Ref Range    Sodium 142 136 - 145 mmol/L    Potassium 3.6 3.5 - 5.1 mmol/L    Chloride 109 (H) 98 - 107 mmol/L    CO2 26 21 - 32 mmol/L    Anion gap 7 7 - 16 mmol/L    Glucose 125 (H) 65 - 100 mg/dL    BUN 12 6 - 23 MG/DL    Creatinine 0.58 (L) 0.6 - 1.0 MG/DL    GFR est AA >60 >60 ml/min/1.73m2    GFR est non-AA >60 >60 ml/min/1.73m2    Calcium 8.6 8.3 - 10.4 MG/DL    Bilirubin, total 0.4 0.2 - 1.1 MG/DL    ALT (SGPT) 38 12 - 65 U/L    AST (SGOT) 21 15 - 37 U/L    Alk. phosphatase 58 50 - 136 U/L    Protein, total 6.6 6.3 - 8.2 g/dL    Albumin 3.4 (L) 3.5 - 5.0 g/dL    Globulin 3.2 2.3 - 3.5 g/dL    A-G Ratio 1.1 (L) 1.2 - 3.5     MAGNESIUM    Collection Time: 02/25/19 12:12 PM   Result Value Ref Range    Magnesium 2.1 1.8 - 2.4 mg/dL   PTT    Collection Time: 02/25/19 12:12 PM   Result Value Ref Range    aPTT 27.8 24.7 - 39.8 SEC   FIBRINOGEN    Collection Time: 02/25/19 12:12 PM   Result Value Ref Range    Fibrinogen 258 190 - 501 mg/dL       Imaging:  MRI CERVICAL SPINE WITHOUT CONTRAST.     COMPARISON: Herpes zoster, cord signal abnormality and extremity weakness.     HISTORY: 26 January 2019.     TECHNIQUE: Sagittal T1 and T2-weighted images, axial T2 and gradient-echo  images.        FINDINGS: The focal cord signal abnormality centered at the C5 level remains. There is now distinct contrast enhancement following gadolinium, best seen on  the axial images. No new cord lesions.     Spinal alignment anatomic. Included posterior fossa structures unremarkable. Bone marrow signal intensity within normal limits.      C2-3: No focal disc herniation or stenosis. C3-4: No focal disc herniation or stenosis. C4-5: No focal disc herniation or stenosis. C5-6: No focal disc herniation or stenosis. C6-7: No focal disc herniation or stenosis. C7-T1: No focal disc herniation or stenosis.     IMPRESSION  IMPRESSION: Focal cord signal abnormality at the C5 level appears similar in  size.  There is now clearly contrast enhancement following gadolinium, better  seen on the axial images. No contrast-enhancement appreciated on the prior  study. No new cord lesions. Differential diagnosis remains the same, transverse  myelitis versus demyelination versus neoplasia. ASSESSMENT:  Problem List  Date Reviewed: 1/28/2019          Codes Class Noted    * (Principal) Myelitis ([facility] ICD-10-CM: E97.59  ICD-9-CM: 323.9  2/23/2019        History of shingles ICD-10-CM: Z86.19  ICD-9-CM: V12.09  1/29/2019        Myelitis due to herpes zoster ([facility] ICD-10-CM: B02.24  ICD-9-CM: 053.14  1/26/2019        Hyperreflexia ICD-10-CM: R29.2  ICD-9-CM: 796.1  1/24/2019        Paresthesia of both legs ICD-10-CM: R20.2  ICD-9-CM: 782.0  1/24/2019        Pain in both lower extremities ICD-10-CM: M79.604, M79.605  ICD-9-CM: 729.5  1/24/2019        Hashimoto's thyroiditis ICD-10-CM: E06.3  ICD-9-CM: 170. 2  Unknown        Goiter ICD-10-CM: E04.9  ICD-9-CM: 240.9  6/18/2018        Family history of thyroid cancer ICD-10-CM: Z80.8  ICD-9-CM: V16.8  6/18/2018            Ms. Martha Starr is a 37 y.o. female admitted on 2/23/2019. The primary encounter diagnosis was Myelitis ([facility] Diagnoses of History of plasmapheresis, History of shingles, and Paresthesia of both legs were also pertinent to this visit. Her PMH includes hashimotos thyroiditis. She was recently admitted from 1/26 - 1/31 for what was felt to be new MS flare. Had neuro w/u with normal NCV, MRI C-s[ine with focal T2 abnormality at C5, MRI brain with R frontal centrum ovale lesion, and LP with elevated VZV Ab. Was treated with acyclovir and steroids. She returned on 2/23 with recurrent LE and UE paresthesia. F/u MRI c-spine with focal C5 lesion. She was admitted for ongoing myelitis. LP performed 2/23. We were consulted for apheresis.     RECOMMENDATIONS:  Myelitis  - On ACV, Solumedrol  - Per neuro, apheresis every other day x 5 treatments - coordinator contacted to begin today  - IR consulted for temp pheresis line  - Labs ordered per treatment plan  2/26 Rec'd first apheresis treatment yesterday, tolerated well. Next treatment due tomorrow. Thank you for allowing us to participate in the care of Ms. Iqbal. Barbara Haynes NP   [facility] Hematology [facility] On[facility]  [address] [address]  Office : [phone]  Fax : [phone]         Attending Addendum:  I have personally performed a face to face diagnostic evaluation on this patient. I have reviewed and agree with the care plan as documented above by Barbara Haynes N.P.  My findings are as follows: Patient appears lethargic, heart rate regular without murmurs, abdomen is non-tender, bowel sounds are positive. Middle aged lady admitted w myelitis, and recommended to undergo plasmapheresis per neurology. Central line placed. Procedure discussed in detail w/ patient. Will plan to initiate every other day x 5 per neuro rec's. Closely monitor for tolerance. Tolerated plasmapheresis well yesterday. Repeat tomorrow. Total time 25 min, over 50% in direct consultation.          Bina Hernandez MD  [facility] Hematology/Oncology  [id number] [facility]  Office : [phone]  Fax : [phone]

## 2019-02-26 NOTE — PROGRESS NOTES
Neurology Daily Progress Note     Assessment:     C5 myelopathy with abnormal MRI. MRI findings are progressive including contrast enhancement and increase in size of lesion on axial T2 sequences. The differential diagnosis includes zoster myelitis or clinically isolated syndrome of transverse myelitis. The patient's symptoms have worsened and MRI has progressed following completion of appropriate course of antiviral therapy. Plan:     Continue IV acyclovir and methylprednisolone    Continue apheresis       Subjective: Interval history:    Patient had first PLEX treatment yesterday. Tolerated this well. Reports some continued paraesthesias on right side. Otherwise, doing well. History:    Jake Lundberg is a 37 y.o. female who is being seen for parasthesias. Review of systems negative with exception of pertinent positives and negatives noted above.        Objective:     Vitals:    02/25/19 2146 02/26/19 0044 02/26/19 0427 02/26/19 0731   BP: 94/57 102/67 97/60 110/72   Pulse: 66 (!) 55 61 (!) 54   Resp: 18 18 20 18   Temp: 98.2 °F (36.8 °C) 98.3 °F (36.8 °C) 98.3 °F (36.8 °C) 98.3 °F (36.8 °C)   SpO2: 97% 97% 97% 97%   Weight:  171 lb (77.6 kg)            Current Facility-Administered Medications:     sodium chloride (NS) flush 10-40 mL, 10-40 mL, IntraVENous, PRN, Danna Wilde MD    valACYclovir (VALTREX) tablet 1,000 mg, 1,000 mg, Oral, DAILY, Vijaya Blanton MD, 1,000 mg at 02/26/19 8343    gabapentin (NEURONTIN) capsule 100 mg, 100 mg, Oral, TID, Zo Murillo MD, 100 mg at 02/26/19 0753    sertraline (ZOLOFT) tablet 50 mg, 50 mg, Oral, DAILY, Zo Murillo MD, 50 mg at 02/26/19 0753    sodium chloride (NS) flush 5-40 mL, 5-40 mL, IntraVENous, Q8H, Zo Murillo MD, 10 mL at 02/26/19 0555    sodium chloride (NS) flush 5-40 mL, 5-40 mL, IntraVENous, PRN, Tahir Murillo MD    methylPREDNISolone (PF) (Solu-MEDROL) 500 mg in 0.9% sodium chloride 100 mL IVPB, 500 mg, IntraVENous, Q12H, Zo Murillo MD, Last Rate: 200 mL/hr at 02/25/19 2308, 500 mg at 02/25/19 2308    No results found for this or any previous visit (from the past 12 hour(s)). Physical Exam:  General - Well developed, well nourished, in no apparent distress. Pleasant and conversent. HEENT - Normocephalic, atraumatic. Conjunctiva are clear. Neck - Supple without masses  Extremities - Peripheral pulses intact. No edema and no rashes. Neurological examination - Comprehension, attention, memory and reasoning are intact. Language and speech are normal.   On cranial nerve examination, (II, III, IV, VI) pupils are equal, round, and reactive to light. Extraocular motility is normal. (V, VII) Face is symmetric and sensation is intact to light touch. (VIII) Hearing is intact. (IX, X) Palate and uvula elevate symmetrically. Voice is normal. (XI) Shoulder shrug strong and equal bilaterally. (XII)Tongue is midline. Motor examination - There is normal muscle tone and bulk. Power is full throughout. Muscle stretch reflexes are diffusely hyperactive, with clonus at bilateral patella and bilateral achilles. Positive cardoso-tromner bilaterally L>R. Plantar response is extensor. Sensation is impaired to light and sharp touch on the right. Signed By: Maurice Sanabria NP     February 26, 2019    And    Attending physician    No problems with initial trial  plasma exchange.     No overall change in neurologic function at this time in terms of her examination she continues to have a little greater weakness on the right than on the left I believe the muscle tone is clearly spastic in both lower extremities

## 2019-02-27 LAB
ALBUMIN SERPL-MCNC: 3.5 G/DL (ref 3.5–5)
ALBUMIN/GLOB SERPL: 1.9 {RATIO} (ref 1.2–3.5)
ALP SERPL-CCNC: 33 U/L (ref 50–136)
ALT SERPL-CCNC: 22 U/L (ref 12–65)
ANION GAP SERPL CALC-SCNC: 8 MMOL/L (ref 7–16)
APTT PPP: 28 SEC (ref 24.7–39.8)
AST SERPL-CCNC: 8 U/L (ref 15–37)
BASOPHILS # BLD: 0 K/UL (ref 0–0.2)
BASOPHILS NFR BLD: 0 % (ref 0–2)
BILIRUB SERPL-MCNC: 0.4 MG/DL (ref 0.2–1.1)
BUN SERPL-MCNC: 15 MG/DL (ref 6–23)
CALCIUM SERPL-MCNC: 8.4 MG/DL (ref 8.3–10.4)
CHLORIDE SERPL-SCNC: 110 MMOL/L (ref 98–107)
CO2 SERPL-SCNC: 25 MMOL/L (ref 21–32)
CREAT SERPL-MCNC: 0.59 MG/DL (ref 0.6–1)
DIFFERENTIAL METHOD BLD: ABNORMAL
EBV PCR QUANT VIACTI,XEBVPT: NORMAL
EOSINOPHIL # BLD: 0 K/UL (ref 0–0.8)
EOSINOPHIL NFR BLD: 0 % (ref 0.5–7.8)
ERYTHROCYTE [DISTWIDTH] IN BLOOD BY AUTOMATED COUNT: 13.1 % (ref 11.9–14.6)
FIBRINOGEN PPP-MCNC: 104 MG/DL (ref 190–501)
GLOBULIN SER CALC-MCNC: 1.8 G/DL (ref 2.3–3.5)
GLUCOSE SERPL-MCNC: 139 MG/DL (ref 65–100)
HCT VFR BLD AUTO: 38.8 % (ref 35.8–46.3)
HGB BLD-MCNC: 12.8 G/DL (ref 11.7–15.4)
HSV 1,XHSV1: NORMAL
HSV 2,XHSV2: NORMAL
IMM GRANULOCYTES # BLD AUTO: 0.2 K/UL (ref 0–0.5)
IMM GRANULOCYTES NFR BLD AUTO: 2 % (ref 0–5)
LYMPHOCYTES # BLD: 1 K/UL (ref 0.5–4.6)
LYMPHOCYTES NFR BLD: 10 % (ref 13–44)
MAGNESIUM SERPL-MCNC: 2.3 MG/DL (ref 1.8–2.4)
MCH RBC QN AUTO: 30.8 PG (ref 26.1–32.9)
MCHC RBC AUTO-ENTMCNC: 33 G/DL (ref 31.4–35)
MCV RBC AUTO: 93.5 FL (ref 79.6–97.8)
MONOCYTES # BLD: 0.4 K/UL (ref 0.1–1.3)
MONOCYTES NFR BLD: 4 % (ref 4–12)
NEUTS SEG # BLD: 8.1 K/UL (ref 1.7–8.2)
NEUTS SEG NFR BLD: 84 % (ref 43–78)
NRBC # BLD: 0 K/UL (ref 0–0.2)
PLATELET # BLD AUTO: 242 K/UL (ref 150–450)
PMV BLD AUTO: 10.7 FL (ref 9.4–12.3)
POTASSIUM SERPL-SCNC: 4.2 MMOL/L (ref 3.5–5.1)
PROT SERPL-MCNC: 5.3 G/DL (ref 6.3–8.2)
RBC # BLD AUTO: 4.15 M/UL (ref 4.05–5.2)
SODIUM SERPL-SCNC: 143 MMOL/L (ref 136–145)
SPECIMEN SOURCE: NORMAL
VZV DNA DETEC, PCR,XVZVPT: NORMAL
WBC # BLD AUTO: 9.6 K/UL (ref 4.3–11.1)

## 2019-02-27 PROCEDURE — 83735 ASSAY OF MAGNESIUM: CPT

## 2019-02-27 PROCEDURE — 36415 COLL VENOUS BLD VENIPUNCTURE: CPT

## 2019-02-27 PROCEDURE — 74011250636 HC RX REV CODE- 250/636: Performed by: INTERNAL MEDICINE

## 2019-02-27 PROCEDURE — P9045 ALBUMIN (HUMAN), 5%, 250 ML: HCPCS | Performed by: INTERNAL MEDICINE

## 2019-02-27 PROCEDURE — 99232 SBSQ HOSP IP/OBS MODERATE 35: CPT | Performed by: INTERNAL MEDICINE

## 2019-02-27 PROCEDURE — 36514 APHERESIS PLASMA: CPT

## 2019-02-27 PROCEDURE — 74011250637 HC RX REV CODE- 250/637: Performed by: INTERNAL MEDICINE

## 2019-02-27 PROCEDURE — 85730 THROMBOPLASTIN TIME PARTIAL: CPT

## 2019-02-27 PROCEDURE — 99232 SBSQ HOSP IP/OBS MODERATE 35: CPT | Performed by: PSYCHIATRY & NEUROLOGY

## 2019-02-27 PROCEDURE — 74011000258 HC RX REV CODE- 258: Performed by: INTERNAL MEDICINE

## 2019-02-27 PROCEDURE — 65270000029 HC RM PRIVATE

## 2019-02-27 PROCEDURE — 80053 COMPREHEN METABOLIC PANEL: CPT

## 2019-02-27 PROCEDURE — 85025 COMPLETE CBC W/AUTO DIFF WBC: CPT

## 2019-02-27 PROCEDURE — 85384 FIBRINOGEN ACTIVITY: CPT

## 2019-02-27 RX ORDER — SODIUM CHLORIDE 0.9 % (FLUSH) 0.9 %
10 SYRINGE (ML) INJECTION AS NEEDED
Status: ACTIVE | OUTPATIENT
Start: 2019-02-27 | End: 2019-02-27

## 2019-02-27 RX ORDER — SODIUM CHLORIDE 9 MG/ML
25 INJECTION, SOLUTION INTRAVENOUS CONTINUOUS
Status: DISPENSED | OUTPATIENT
Start: 2019-02-27 | End: 2019-02-27

## 2019-02-27 RX ORDER — ALBUMIN HUMAN 50 G/1000ML
3000 SOLUTION INTRAVENOUS ONCE
Status: COMPLETED | OUTPATIENT
Start: 2019-02-27 | End: 2019-02-27

## 2019-02-27 RX ADMIN — Medication 30 ML: at 10:00

## 2019-02-27 RX ADMIN — SODIUM CHLORIDE 500 MG: 900 INJECTION, SOLUTION INTRAVENOUS at 22:24

## 2019-02-27 RX ADMIN — Medication 5 ML: at 15:53

## 2019-02-27 RX ADMIN — GABAPENTIN 100 MG: 100 CAPSULE ORAL at 07:58

## 2019-02-27 RX ADMIN — Medication 10 ML: at 10:00

## 2019-02-27 RX ADMIN — SODIUM CHLORIDE 500 MG: 900 INJECTION, SOLUTION INTRAVENOUS at 12:12

## 2019-02-27 RX ADMIN — SERTRALINE HYDROCHLORIDE 50 MG: 50 TABLET ORAL at 07:58

## 2019-02-27 RX ADMIN — Medication 30 ML: at 11:45

## 2019-02-27 RX ADMIN — Medication 10 ML: at 05:54

## 2019-02-27 RX ADMIN — GABAPENTIN 100 MG: 100 CAPSULE ORAL at 15:53

## 2019-02-27 RX ADMIN — CALCIUM GLUCONATE 2 G: 98 INJECTION, SOLUTION INTRAVENOUS at 10:03

## 2019-02-27 RX ADMIN — GABAPENTIN 100 MG: 100 CAPSULE ORAL at 21:58

## 2019-02-27 RX ADMIN — Medication 10 ML: at 21:58

## 2019-02-27 RX ADMIN — VALACYCLOVIR HYDROCHLORIDE 1000 MG: 500 TABLET, FILM COATED ORAL at 07:58

## 2019-02-27 RX ADMIN — SODIUM CHLORIDE 25 ML/HR: 900 INJECTION, SOLUTION INTRAVENOUS at 10:00

## 2019-02-27 RX ADMIN — ALBUMIN (HUMAN) 150 G: 12.5 INJECTION, SOLUTION INTRAVENOUS at 10:01

## 2019-02-27 NOTE — PROGRESS NOTES
Neurology Daily Progress Note     Assessment:     C5 myelopathy with abnormal MRI. MRI findings are progressive including contrast enhancement and increase in size of lesion on axial T2 sequences. The differential diagnosis includes zoster myelitis or clinically isolated syndrome of transverse myelitis. The patient's symptoms have worsened and MRI has progressed following completion of appropriate course of antiviral therapy. Plan:     Continue IV acyclovir and methylprednisolone    Continue apheresis       Subjective: Interval history:    Patient doing well today. RLE weakness is improved. Continues to have some numbness. Scheduled for second PLEX treatment today. History:    Vinayak Box is a 37 y.o. female who is being seen for parasthesias. Review of systems negative with exception of pertinent positives and negatives noted above.        Objective:     Vitals:    02/27/19 0446 02/27/19 0715 02/27/19 1010 02/27/19 1056   BP: 121/78 100/64 94/58 92/60   Pulse: (!) 55 (!) 52 74 62   Resp: 18 18 18 18   Temp: 98.3 °F (36.8 °C) 98.5 °F (36.9 °C) 97.4 °F (36.3 °C)    SpO2: 97% 99% 98% 99%   Weight: 170 lb 6.4 oz (77.3 kg)             Current Facility-Administered Medications:     0.9% sodium chloride infusion, 25 mL/hr, IntraVENous, CONTINUOUS, Danna Wilde MD, Last Rate: 25 mL/hr at 02/27/19 1000, 25 mL/hr at 02/27/19 1000    calcium gluconate 2 g in 0.9% sodium chloride 250 mL, 2 g, IntraVENous, ONCE, Danna Wilde MD, Last Rate: 59 mL/hr at 02/27/19 1003, 2 g at 02/27/19 1003    saline peripheral flush soln 10 mL, 10 mL, InterCATHeter, PRN, Alexander Hallman MD, 10 mL at 02/27/19 1000    sodium chloride (NS) flush 10-40 mL, 10-40 mL, IntraVENous, PRN, Danna Wilde MD    valACYclovir (VALTREX) tablet 1,000 mg, 1,000 mg, Oral, DAILY, Tahira Lopez MD, 1,000 mg at 02/27/19 8307    gabapentin (NEURONTIN) capsule 100 mg, 100 mg, Oral, TID, Katy Murillo MD, 100 mg at 02/27/19 0758    sertraline (ZOLOFT) tablet 50 mg, 50 mg, Oral, DAILY, Zo Murillo MD, 50 mg at 02/27/19 0758    sodium chloride (NS) flush 5-40 mL, 5-40 mL, IntraVENous, Q8H, Zo Murillo MD, 10 mL at 02/27/19 0554    sodium chloride (NS) flush 5-40 mL, 5-40 mL, IntraVENous, PRN, Zo Murillo MD, 30 mL at 02/27/19 1000    methylPREDNISolone (PF) (Solu-MEDROL) 500 mg in 0.9% sodium chloride 100 mL IVPB, 500 mg, IntraVENous, Q12H, Zo Murillo MD, Last Rate: 200 mL/hr at 02/26/19 2129, 500 mg at 02/26/19 2129    Recent Results (from the past 12 hour(s))   CBC WITH AUTOMATED DIFF    Collection Time: 02/27/19  4:49 AM   Result Value Ref Range    WBC 9.6 4.3 - 11.1 K/uL    RBC 4.15 4.05 - 5.2 M/uL    HGB 12.8 11.7 - 15.4 g/dL    HCT 38.8 35.8 - 46.3 %    MCV 93.5 79.6 - 97.8 FL    MCH 30.8 26.1 - 32.9 PG    MCHC 33.0 31.4 - 35.0 g/dL    RDW 13.1 11.9 - 14.6 %    PLATELET 023 762 - 176 K/uL    MPV 10.7 9.4 - 12.3 FL    ABSOLUTE NRBC 0.00 0.0 - 0.2 K/uL    DF AUTOMATED      NEUTROPHILS 84 (H) 43 - 78 %    LYMPHOCYTES 10 (L) 13 - 44 %    MONOCYTES 4 4.0 - 12.0 %    EOSINOPHILS 0 (L) 0.5 - 7.8 %    BASOPHILS 0 0.0 - 2.0 %    IMMATURE GRANULOCYTES 2 0.0 - 5.0 %    ABS. NEUTROPHILS 8.1 1.7 - 8.2 K/UL    ABS. LYMPHOCYTES 1.0 0.5 - 4.6 K/UL    ABS. MONOCYTES 0.4 0.1 - 1.3 K/UL    ABS. EOSINOPHILS 0.0 0.0 - 0.8 K/UL    ABS. BASOPHILS 0.0 0.0 - 0.2 K/UL    ABS. IMM.  GRANS. 0.2 0.0 - 0.5 K/UL   FIBRINOGEN    Collection Time: 02/27/19  4:49 AM   Result Value Ref Range    Fibrinogen 104 (L) 190 - 501 mg/dL   PTT    Collection Time: 02/27/19  4:49 AM   Result Value Ref Range    aPTT 28.0 24.7 - 86.2 SEC   METABOLIC PANEL, COMPREHENSIVE    Collection Time: 02/27/19  4:49 AM   Result Value Ref Range    Sodium 143 136 - 145 mmol/L    Potassium 4.2 3.5 - 5.1 mmol/L    Chloride 110 (H) 98 - 107 mmol/L    CO2 25 21 - 32 mmol/L    Anion gap 8 7 - 16 mmol/L    Glucose 139 (H) 65 - 100 mg/dL    BUN 15 6 - 23 MG/DL    Creatinine 0.59 (L) 0.6 - 1.0 MG/DL    GFR est AA >60 >60 ml/min/1.73m2    GFR est non-AA >60 >60 ml/min/1.73m2    Calcium 8.4 8.3 - 10.4 MG/DL    Bilirubin, total 0.4 0.2 - 1.1 MG/DL    ALT (SGPT) 22 12 - 65 U/L    AST (SGOT) 8 (L) 15 - 37 U/L    Alk. phosphatase 33 (L) 50 - 136 U/L    Protein, total 5.3 (L) 6.3 - 8.2 g/dL    Albumin 3.5 3.5 - 5.0 g/dL    Globulin 1.8 (L) 2.3 - 3.5 g/dL    A-G Ratio 1.9 1.2 - 3.5     MAGNESIUM    Collection Time: 02/27/19  4:49 AM   Result Value Ref Range    Magnesium 2.3 1.8 - 2.4 mg/dL         Physical Exam:  General - Well developed, well nourished, in no apparent distress. Pleasant and conversent. HEENT - Normocephalic, atraumatic. Conjunctiva are clear. Neck - Supple without masses  Extremities - Peripheral pulses intact. No edema and no rashes. Neurological examination - Comprehension, attention, memory and reasoning are intact. Language and speech are normal.   On cranial nerve examination, (II, III, IV, VI) pupils are equal, round, and reactive to light. Extraocular motility is normal. (V, VII) Face is symmetric and sensation is intact to light touch. (VIII) Hearing is intact. (IX, X) Palate and uvula elevate symmetrically. Voice is normal. (XI) Shoulder shrug strong and equal bilaterally. (XII)Tongue is midline. Motor examination - There is normal muscle tone and bulk. Power is full throughout. Muscle stretch reflexes are diffusely hyperactive, with clonus at bilateral patella and bilateral achilles. Positive cardoso-tromner bilaterally L>R. Plantar response is extensor. Sensation is impaired to light and sharp touch on the right.        Signed By: Royce Rueda NP     February 27, 2019

## 2019-02-27 NOTE — INTERDISCIPLINARY ROUNDS
Interdisciplinary team rounds were held 2/27/2019 with the following team members:Care Management, Nursing, Occupational Therapy and Physician and the patient. Plan of care discussed. See clinical pathway and/or care plan for interventions and desired outcomes. Possible home tomorrow if medically ready.

## 2019-02-27 NOTE — PROGRESS NOTES
Hospitalist Progress Note     Admit Date:  2019  8:50 AM   Name:  Lashell Eaton   Age:  37 y.o.  :  1975   MRN:  [id number]   PCP:  Yanci Yuen MD  Treatment Team: Attending Provider: Cuca Knight MD; Consulting Provider: Wilman Lemons MD; Utilization Review: Jose Silva RN; Consulting Provider: Jarad Damico MD; Care Manager: Saran Hogan    HPI/Subjective:        Ms. Brooklyn Wilson is a 38 yo female with PMH of varicella zoster, hashimotos thyroiditis and admit  for what was felt to be a new multiple sclerosis flare. She began with complaints of LE paresthesia. She had outpatient neurology workup with normal NCV. MRI cspine showed focal T2 abnormality at C5. MRI brain showed right frontal centrum ovale lesion. She had LP that showed elevated VZV Ab and was managed with acyclovir. She was also managed with IV steroids. She was discharged home with outpatient neurology followup and began to have recurrent LE and right UE paresthesia. She was re-evaluated by neurology with followup MRI cspine showing increase of the focal C5 lesion. She has been referred to direct admit for concern for ongoing myelitis. She is s/p repeat LP, pending viral PCR, but negative bacterial  cultures. She has been restarted on IV steroids and acyclovir--> oral valtrex . Neuro and ID following. It is recommended to have hematology consult for apheresis. IR placed apheresis line on 19 and she will have 5 treatments over 10 days, EOT 3-6-19. Also pelvic US showed right ovarian cyst and ABD US negative. She is up to date with age related cancer screenings/ has pending outpatient GYN followup.        19 friend present, diet tolerant, fatigued    Objective:     Patient Vitals for the past 24 hrs:   Temp Pulse Resp BP SpO2   19 1140 97.9 °F (36.6 °C) 75 18 99/64 98 %   19 1056 -- 62 18 92/60 99 %   19 1010 97.4 °F (36.3 °C) 74 18 94/58 98 %   19 0715 98.5 °F (36.9 °C) (!) 52 18 100/64 99 %   02/27/19 0446 98.3 °F (36.8 °C) (!) 55 18 121/78 97 %   02/27/19 0020 98.3 °F (36.8 °C) (!) 52 18 109/70 98 %   02/26/19 2042 98.3 °F (36.8 °C) 66 18 99/62 97 %   02/26/19 1504 99.5 °F (37.5 °C) 66 20 105/66 97 %     Oxygen Therapy  O2 Sat (%): 98 % (02/27/19 1140)  O2 Device: Room air (02/25/19 1155)    Intake/Output Summary (Last 24 hours) at 2/27/2019 1212  Last data filed at 2/27/2019 1100  Gross per 24 hour   Intake 937 ml   Output 3121 ml   Net -2184 ml       *Note that automatically entered I/Os may not be accurate; dependent on patient compliance with collection and accurate  by techs. General:    Well nourished. Alert. No distress   CV:   RRR. No murmur, rub, or gallop. No cyanosis or edema. Lungs:   CTAB. No wheezing, rhonchi, or rales. Anterior exam  Extremities: Warm and dry. Skin:     No rashes or jaundice. Neuro:  No gross focal deficits    Data Review:  I have reviewed all labs, meds, and studies from the last 24 hours:    Recent Results (from the past 24 hour(s))   METABOLIC PANEL, COMPREHENSIVE    Collection Time: 02/26/19 12:49 PM   Result Value Ref Range    Sodium 142 136 - 145 mmol/L    Potassium 3.8 3.5 - 5.1 mmol/L    Chloride 108 (H) 98 - 107 mmol/L    CO2 26 21 - 32 mmol/L    Anion gap 8 7 - 16 mmol/L    Glucose 188 (H) 65 - 100 mg/dL    BUN 14 6 - 23 MG/DL    Creatinine 0.78 0.6 - 1.0 MG/DL    GFR est AA >60 >60 ml/min/1.73m2    GFR est non-AA >60 >60 ml/min/1.73m2    Calcium 8.5 8.3 - 10.4 MG/DL    Bilirubin, total 0.6 0.2 - 1.1 MG/DL    ALT (SGPT) 23 12 - 65 U/L    AST (SGOT) 12 (L) 15 - 37 U/L    Alk.  phosphatase 39 (L) 50 - 136 U/L    Protein, total 6.0 (L) 6.3 - 8.2 g/dL    Albumin 4.3 3.5 - 5.0 g/dL    Globulin 1.7 (L) 2.3 - 3.5 g/dL    A-G Ratio 2.5 1.2 - 3.5     MAGNESIUM    Collection Time: 02/26/19 12:49 PM   Result Value Ref Range    Magnesium 2.2 1.8 - 2.4 mg/dL   CBC WITH AUTOMATED DIFF    Collection Time: 02/27/19  4:49 AM   Result Value Ref Range    WBC 9.6 4.3 - 11.1 K/uL    RBC 4.15 4.05 - 5.2 M/uL    HGB 12.8 11.7 - 15.4 g/dL    HCT 38.8 35.8 - 46.3 %    MCV 93.5 79.6 - 97.8 FL    MCH 30.8 26.1 - 32.9 PG    MCHC 33.0 31.4 - 35.0 g/dL    RDW 13.1 11.9 - 14.6 %    PLATELET 190 410 - 191 K/uL    MPV 10.7 9.4 - 12.3 FL    ABSOLUTE NRBC 0.00 0.0 - 0.2 K/uL    DF AUTOMATED      NEUTROPHILS 84 (H) 43 - 78 %    LYMPHOCYTES 10 (L) 13 - 44 %    MONOCYTES 4 4.0 - 12.0 %    EOSINOPHILS 0 (L) 0.5 - 7.8 %    BASOPHILS 0 0.0 - 2.0 %    IMMATURE GRANULOCYTES 2 0.0 - 5.0 %    ABS. NEUTROPHILS 8.1 1.7 - 8.2 K/UL    ABS. LYMPHOCYTES 1.0 0.5 - 4.6 K/UL    ABS. MONOCYTES 0.4 0.1 - 1.3 K/UL    ABS. EOSINOPHILS 0.0 0.0 - 0.8 K/UL    ABS. BASOPHILS 0.0 0.0 - 0.2 K/UL    ABS. IMM. GRANS. 0.2 0.0 - 0.5 K/UL   FIBRINOGEN    Collection Time: 02/27/19  4:49 AM   Result Value Ref Range    Fibrinogen 104 (L) 190 - 501 mg/dL   PTT    Collection Time: 02/27/19  4:49 AM   Result Value Ref Range    aPTT 28.0 24.7 - 84.4 SEC   METABOLIC PANEL, COMPREHENSIVE    Collection Time: 02/27/19  4:49 AM   Result Value Ref Range    Sodium 143 136 - 145 mmol/L    Potassium 4.2 3.5 - 5.1 mmol/L    Chloride 110 (H) 98 - 107 mmol/L    CO2 25 21 - 32 mmol/L    Anion gap 8 7 - 16 mmol/L    Glucose 139 (H) 65 - 100 mg/dL    BUN 15 6 - 23 MG/DL    Creatinine 0.59 (L) 0.6 - 1.0 MG/DL    GFR est AA >60 >60 ml/min/1.73m2    GFR est non-AA >60 >60 ml/min/1.73m2    Calcium 8.4 8.3 - 10.4 MG/DL    Bilirubin, total 0.4 0.2 - 1.1 MG/DL    ALT (SGPT) 22 12 - 65 U/L    AST (SGOT) 8 (L) 15 - 37 U/L    Alk.  phosphatase 33 (L) 50 - 136 U/L    Protein, total 5.3 (L) 6.3 - 8.2 g/dL    Albumin 3.5 3.5 - 5.0 g/dL    Globulin 1.8 (L) 2.3 - 3.5 g/dL    A-G Ratio 1.9 1.2 - 3.5     MAGNESIUM    Collection Time: 02/27/19  4:49 AM   Result Value Ref Range    Magnesium 2.3 1.8 - 2.4 mg/dL        All Micro Results     Procedure Component Value Units Date/Time    HSV 1 AND 2 PCR [[id number]] Collected: 02/23/19 1047    Order Status:  Completed Specimen:  Other from Whole Blood Updated:  02/27/19 1001     Specimen source BLOOD        HSV 1       RESULTS SCANNED IN [facility]           Comment: PERFORMED BY [facility]        HSV 2       RESULTS SCANNED IN [facility]           Comment: PERFORMED BY [facility]       TUBE 2 - CULTURE WITH Nupur Lety, CSF [[id number]] Collected:  02/23/19 1128    Order Status:  Completed Specimen:  Cerebrospinal Fluid Updated:  02/27/19 0828     Special Requests: TUBE 2, CULTURE AND SENSITIVTY AND GRAM STAIN. GRAM STAIN NO WBCS SEEN         NO DEFINITE ORGANISM SEEN        Culture result: NO GROWTH 4 DAYS       MENINGITIS PATHOGENS PANEL (BY PCR), CSF [[id number]] Collected:  02/23/19 1128    Order Status:  Completed Specimen:  Cerebrospinal Fluid Updated:  02/24/19 0626     Meningitis panel       RESULTS SCANNED IN [facility]          CMV BY PCR, QT, BAL [[id number]] Collected:  02/23/19 1137    Order Status:  Canceled     TUBE 1 - HOLD CSF [[id number]]     Order Status:  Sent Specimen:  Cerebrospinal Fluid           No results found for this visit on 02/23/19. Current Meds:  Current Facility-Administered Medications   Medication Dose Route Frequency    0.9% sodium chloride infusion  25 mL/hr IntraVENous CONTINUOUS    saline peripheral flush soln 10 mL  10 mL InterCATHeter PRN    sodium chloride (NS) flush 10-40 mL  10-40 mL IntraVENous PRN    valACYclovir (VALTREX) tablet 1,000 mg  1,000 mg Oral DAILY    gabapentin (NEURONTIN) capsule 100 mg  100 mg Oral TID    sertraline (ZOLOFT) tablet 50 mg  50 mg Oral DAILY    sodium chloride (NS) flush 5-40 mL  5-40 mL IntraVENous Q8H    sodium chloride (NS) flush 5-40 mL  5-40 mL IntraVENous PRN    methylPREDNISolone (PF) (Solu-MEDROL) 500 mg in 0.9% sodium chloride 100 mL IVPB  500 mg IntraVENous Q12H       Other Studies (last 24 hours):  No results found.     Assessment and Plan:     Hospital Problems as of 2/27/2019 Date Reviewed: 1/28/2019          Codes Class Noted - Resolved POA    * (Principal) Myelitis ([facility].) ICD-10-CM: Z77.24  ICD-9-CM: 323.9  2/23/2019 - Present Yes        History of shingles ICD-10-CM: Z86.19  ICD-9-CM: V12.09  1/29/2019 - Present Yes              Plan:  · Myelitis: pending LP studies for  HSV/EBV/CMV/VZV PCR and serum IgG subclass, continue solumedrol 500 mg IV every 12 hours and changed to oral valtrex. Appreciate neuro, heme and ID,  apheresis EOT 3-6-19- case management to see if can set her up for outpatient pheresis.   continue home neurontin and zoloft, PT/OT consults  · Right ovarian cyst: outpatient GYN followup     Discharge planning:  Pending course to home  DVT ppx: SCD  Code status:  Full  Estimated LOS:  Greater than 2 midnights  Risk:  high  Care plan:  Jan, [phone]  Signed:  Ashley Brower MD

## 2019-02-27 NOTE — PROGRESS NOTES
Problem: Falls - Risk of  Goal: *Absence of Falls  Document Dheeraj Fall Risk and appropriate interventions in the flowsheet. Outcome: Progressing Towards Goal  Fall Risk Interventions:  Mobility Interventions: Bed/chair exit alarm         Medication Interventions: Teach patient to arise slowly  Patient remains free from falls this shift.

## 2019-02-27 NOTE — PROGRESS NOTES
Procedure: Therapeutic Plasmapheresis  Dx: Myelitis  Day: 2 of 5  Labs drawn: CBC,CMP, Mag, PTT, Fibrinogen  Fibrinogen: 104  Calcium used:  2 grams  Replacement product: Albumin  Replace volume: 2846 /  Plasma Remove volume: 3121  Fluid Balance: 100%  TPV: 1.2  Issues: none, pt tolerated well. Next procedure date: 2/29/19  Labs needed: same as above  Pt tolerated well.  Report given to Aditya Delcid, primary RN

## 2019-02-27 NOTE — PROGRESS NOTES
[facility] Hematology & Oncology        Inpatient Hematology / Oncology Daily Note    Reason for Consult:  Myelitis [facility]) [J29.02]  Referring Physician:  Andrea Soliman MD    24 Hour Events:  Afebrile, VSS  Plasmapheresis #2 of 5 today  Next tx planned for 2/29  Tolerating treatment well  Family at bedside    ROS:  Constitutional: Negative for fever, chills. CV: Negative for chest pain, palpitations, edema. Respiratory: Negative for dyspnea, cough, wheezing. GI: Negative for nausea, abdominal pain, diarrhea. 10 point review of systems is otherwise negative with the exception of the elements mentioned above in the HPI.        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    positive on PAP    Multiple sclerosis ([address] 01/2019    Rheumatoid arthritis ([address]      Past Surgical History:   Procedure Laterality Date    IR INSERT NON TUNL CVC OVER 5 YRS  2/25/2019     Family History   Problem Relation Age of Onset    MS Mother     Diabetes Father         pre-diabetes    Thyroid Cancer Father     Thyroid Disease Sister         hypothyroidism    Liver Disease Brother         autoimmune hepatitis, had liver transplant    Thyroid Disease Brother         hypothyroidism    Thyroid Disease Sister         hypothyroidism    Heart Disease Brother         tachycardia    Breast Cancer Paternal Aunt     Heart Disease Maternal Aunt     Thyroid Disease Maternal Aunt         hyperthyroidism    Thyroid Disease Maternal Grandmother         hypothyroidism    Thyroid Disease Cousin         hypothyroidism     Social History     Socioeconomic History    Marital status:      Spouse name: Jonny Trent Number of children: 3    Years of education: Not on file    Highest education level: Not on file   Social Needs    Financial resource strain: Not on file    Food insecurity - worry: Not on file    Food insecurity - inability: Not on file  Transportation needs - medical: Not on file   [employer] needs - non-medical: Not on file   Occupational History    Occupation: stay at home mom     Employer: NOT EMPLOYED   Tobacco Use    Smoking status: Never Smoker    Smokeless tobacco: Never Used   Substance and Sexual Activity    Alcohol use: Yes     Alcohol/week: 1.8 oz     Types: 3 Standard drinks or equivalent per week     Comment: occ    Drug use: No    Sexual activity: Yes     Partners: Male     Birth control/protection: Surgical   Other Topics Concern    Not on file   Social History Narrative    Chidren: Shanon Dudley (12/25/98), Kostas (2/21/06), Elliot (9/23/09).   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     Current Facility-Administered Medications   Medication Dose Route Frequency Provider Last Rate Last Dose    0.9% sodium chloride infusion  25 mL/hr IntraVENous CONTINUOUS Mini Sneed MD   Stopped at 02/27/19 1130    saline peripheral flush soln 10 mL  10 mL InterCATHeter PRN Mini Sneed MD   10 mL at 02/27/19 1000    sodium chloride (NS) flush 10-40 mL  10-40 mL IntraVENous PRN Mini Sneed MD        valACYclovir (VALTREX) tablet 1,000 mg  1,000 mg Oral DAILY Yoanna Mckeon MD   1,000 mg at 02/27/19 0758    gabapentin (NEURONTIN) capsule 100 mg  100 mg Oral TID Davis-Pachter, Micki Crigler, MD   100 mg at 02/27/19 0758    sertraline (ZOLOFT) tablet 50 mg  50 mg Oral DAILY Julia Bobo MD   50 mg at 02/27/19 0758    sodium chloride (NS) flush 5-40 mL  5-40 mL IntraVENous Q8H Julia Bobo MD   10 mL at 02/27/19 0554    sodium chloride (NS) flush 5-40 mL  5-40 mL IntraVENous PRN Julia Bobo MD   30 mL at 02/27/19 1145    methylPREDNISolone (PF) (Solu-MEDROL) 500 mg in 0.9% sodium chloride 100 mL IVPB  500 mg IntraVENous Q12H oZ Murillo  mL/hr at 02/26/19 2129 500 mg at 02/26/19 2129       OBJECTIVE:  Patient Vitals for the past 8 hrs:   BP Temp Pulse Resp SpO2 Weight   02/27/19 1140 99/64 97.9 °F (36.6 °C) 75 18 98 % --   19 1056 92/60 -- 62 18 99 % --   19 1010 94/58 97.4 °F (36.3 °C) 74 18 98 % --   19 0715 100/64 98.5 °F (36.9 °C) (!) 52 18 99 % --   19 0446 121/78 98.3 °F (36.8 °C) (!) 55 18 97 % 170 lb 6.4 oz (77.3 kg)     Temp (24hrs), Av.3 °F (36.8 °C), Min:97.4 °F (36.3 °C), Max:99.5 °F (37.5 °C)    701 -  190  In: 457 [I.V.:457]  Out: 3121     Physical Exam:  Constitutional: Well developed, well nourished female in no acute distress, sitting comfortably in the hospital bed. HEENT: Normocephalic and atraumatic. Oropharynx is clear, mucous membranes are moist.  Extraocular muscles are intact. Sclerae anicteric. Neck supple without JVD. No thyromegaly present. Lymph node   Deferred   Skin Warm and dry. No bruising and no rash noted. No erythema. No pallor. Respiratory Lungs are clear to auscultation bilaterally without wheezes, rales or rhonchi, normal air exchange without accessory muscle use. CVS Normal rate, regular rhythm and normal S1 and S2. No murmurs, gallops, or rubs. Abdomen Soft, nontender and nondistended, normoactive bowel sounds. No palpable mass. No hepatosplenomegaly. Neuro Grossly nonfocal with no obvious sensory or motor deficits. BUE/BLE strength 5/5. MSK Normal range of motion in general.  No edema and no tenderness. Psych Appropriate mood and affect.         Labs:    Recent Results (from the past 24 hour(s))   METABOLIC PANEL, COMPREHENSIVE    Collection Time: 19 12:49 PM   Result Value Ref Range    Sodium 142 136 - 145 mmol/L    Potassium 3.8 3.5 - 5.1 mmol/L    Chloride 108 (H) 98 - 107 mmol/L    CO2 26 21 - 32 mmol/L    Anion gap 8 7 - 16 mmol/L    Glucose 188 (H) 65 - 100 mg/dL    BUN 14 6 - 23 MG/DL    Creatinine 0.78 0.6 - 1.0 MG/DL    GFR est AA >60 >60 ml/min/1.73m2    GFR est non-AA >60 >60 ml/min/1.73m2    Calcium 8.5 8.3 - 10.4 MG/DL    Bilirubin, total 0.6 0.2 - 1.1 MG/DL    ALT (SGPT) 23 12 - 65 U/L    AST (SGOT) 12 (L) 15 - 37 U/L    Alk. phosphatase 39 (L) 50 - 136 U/L    Protein, total 6.0 (L) 6.3 - 8.2 g/dL    Albumin 4.3 3.5 - 5.0 g/dL    Globulin 1.7 (L) 2.3 - 3.5 g/dL    A-G Ratio 2.5 1.2 - 3.5     MAGNESIUM    Collection Time: 02/26/19 12:49 PM   Result Value Ref Range    Magnesium 2.2 1.8 - 2.4 mg/dL   CBC WITH AUTOMATED DIFF    Collection Time: 02/27/19  4:49 AM   Result Value Ref Range    WBC 9.6 4.3 - 11.1 K/uL    RBC 4.15 4.05 - 5.2 M/uL    HGB 12.8 11.7 - 15.4 g/dL    HCT 38.8 35.8 - 46.3 %    MCV 93.5 79.6 - 97.8 FL    MCH 30.8 26.1 - 32.9 PG    MCHC 33.0 31.4 - 35.0 g/dL    RDW 13.1 11.9 - 14.6 %    PLATELET 380 224 - 567 K/uL    MPV 10.7 9.4 - 12.3 FL    ABSOLUTE NRBC 0.00 0.0 - 0.2 K/uL    DF AUTOMATED      NEUTROPHILS 84 (H) 43 - 78 %    LYMPHOCYTES 10 (L) 13 - 44 %    MONOCYTES 4 4.0 - 12.0 %    EOSINOPHILS 0 (L) 0.5 - 7.8 %    BASOPHILS 0 0.0 - 2.0 %    IMMATURE GRANULOCYTES 2 0.0 - 5.0 %    ABS. NEUTROPHILS 8.1 1.7 - 8.2 K/UL    ABS. LYMPHOCYTES 1.0 0.5 - 4.6 K/UL    ABS. MONOCYTES 0.4 0.1 - 1.3 K/UL    ABS. EOSINOPHILS 0.0 0.0 - 0.8 K/UL    ABS. BASOPHILS 0.0 0.0 - 0.2 K/UL    ABS. IMM. GRANS. 0.2 0.0 - 0.5 K/UL   FIBRINOGEN    Collection Time: 02/27/19  4:49 AM   Result Value Ref Range    Fibrinogen 104 (L) 190 - 501 mg/dL   PTT    Collection Time: 02/27/19  4:49 AM   Result Value Ref Range    aPTT 28.0 24.7 - 05.3 SEC   METABOLIC PANEL, COMPREHENSIVE    Collection Time: 02/27/19  4:49 AM   Result Value Ref Range    Sodium 143 136 - 145 mmol/L    Potassium 4.2 3.5 - 5.1 mmol/L    Chloride 110 (H) 98 - 107 mmol/L    CO2 25 21 - 32 mmol/L    Anion gap 8 7 - 16 mmol/L    Glucose 139 (H) 65 - 100 mg/dL    BUN 15 6 - 23 MG/DL    Creatinine 0.59 (L) 0.6 - 1.0 MG/DL    GFR est AA >60 >60 ml/min/1.73m2    GFR est non-AA >60 >60 ml/min/1.73m2    Calcium 8.4 8.3 - 10.4 MG/DL    Bilirubin, total 0.4 0.2 - 1.1 MG/DL    ALT (SGPT) 22 12 - 65 U/L    AST (SGOT) 8 (L) 15 - 37 U/L    Alk.  phosphatase 33 (L) 50 - 136 U/L    Protein, total 5.3 (L) 6.3 - 8.2 g/dL    Albumin 3.5 3.5 - 5.0 g/dL    Globulin 1.8 (L) 2.3 - 3.5 g/dL    A-G Ratio 1.9 1.2 - 3.5     MAGNESIUM    Collection Time: 02/27/19  4:49 AM   Result Value Ref Range    Magnesium 2.3 1.8 - 2.4 mg/dL       Imaging:  MRI CERVICAL SPINE WITHOUT CONTRAST.     COMPARISON: Herpes zoster, cord signal abnormality and extremity weakness.     HISTORY: 26 January 2019.     TECHNIQUE: Sagittal T1 and T2-weighted images, axial T2 and gradient-echo  images.        FINDINGS: The focal cord signal abnormality centered at the C5 level remains. There is now distinct contrast enhancement following gadolinium, best seen on  the axial images. No new cord lesions.     Spinal alignment anatomic. Included posterior fossa structures unremarkable. Bone marrow signal intensity within normal limits.      C2-3: No focal disc herniation or stenosis. C3-4: No focal disc herniation or stenosis. C4-5: No focal disc herniation or stenosis. C5-6: No focal disc herniation or stenosis. C6-7: No focal disc herniation or stenosis. C7-T1: No focal disc herniation or stenosis.     IMPRESSION  IMPRESSION: Focal cord signal abnormality at the C5 level appears similar in  size. There is now clearly contrast enhancement following gadolinium, better  seen on the axial images. No contrast-enhancement appreciated on the prior  study. No new cord lesions. Differential diagnosis remains the same, transverse  myelitis versus demyelination versus neoplasia.     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 2  ICD-9-CM: 782.0  1/24/2019        Pain in both lower extremities ICD-10-CM: M79.604, M79.605  ICD-9-CM: 729.5  1/24/2019        Hashimoto's thyroiditis ICD-10-CM: E06.3  ICD-9-CM: 173. 2  Unknown        Goiter ICD-10-CM: E04.9  ICD-9-CM: 240.9  6/18/2018        Family history of thyroid cancer ICD-10-CM: Z80.8  ICD-9-CM: V16.8  6/18/2018            Ms. Neela Alaniz is a 37 y.o. female admitted on 2/23/2019. The primary encounter diagnosis was Myelitis ([facility] Diagnoses of History of plasmapheresis, History of shingles, and Paresthesia of both legs were also pertinent to this visit. Her PMH includes hashimotos thyroiditis. She was recently admitted from 1/26 - 1/31 for what was felt to be new MS flare. Had neuro w/u with normal NCV, MRI C-s[ine with focal T2 abnormality at C5, MRI brain with R frontal centrum ovale lesion, and LP with elevated VZV Ab. Was treated with acyclovir and steroids. She returned on 2/23 with recurrent LE and UE paresthesia. F/u MRI c-spine with focal C5 lesion. She was admitted for ongoing myelitis. LP performed 2/23. We were consulted for apheresis. RECOMMENDATIONS:  Myelitis  - On ACV, Solumedrol  - Per neuro, apheresis every other day x 5 treatments - coordinator contacted to begin today  - IR consulted for temp pheresis line  - Labs ordered per treatment plan  2/26 Rec'd first apheresis treatment yesterday, tolerated well. Next treatment due tomorrow. 2/27 Plasmapheresis #2 of 5 today. Next tx planned for 2/29. Tolerating well. Fibrinogen down to 104. Most likely will need FFP after next treatment. Thank you for allowing us to participate in the care of Ms. Iqbal. Landon Art NP   [facility] Hematology & Oncology  [address] [place] [address]  Office : [phone]  Fax : [phone]       Attending Addendum:  I have personally performed a face to face diagnostic evaluation on this patient.  I have reviewed and agree with the care plan as documented above by Robert Moore N.P.  My findings are as follows: Patient appears lethargic, heart rate regular without murmurs, abdomen is non-tender, bowel sounds are positive. Middle aged lady admitted w myelitis, and recommended to undergo plasmapheresis per neurology. Central line placed. Procedure discussed in detail w/ patient. Will plan to initiate every other day x 5 per neuro rec's. Closely monitor for tolerance. Tolerating plasmapheresis well yesterday. Fibrinogen on lower side and will be monitored. Total time 25 min, over 50% in direct consultation.          Jovanni Hummel MD  [address] [place] [address] Hematology/Oncology  [address] [place] [address]  Office : [phone]  Fax : [phone]

## 2019-02-27 NOTE — PROGRESS NOTES
During IDT rounds it was recommended patient have evaluations by PT and OT. Consults placed for evaluation by PT/OT disciplines. Patient is agreeable to [facility] infusions at [facility] during the week and is aware that she would need to come to [facility] for her Sunday treatment. CM will scheduled once neuro has placed a definitive order in chart. Patient is hopeful to be discharged home tomorrow. CM will continue to follow.

## 2019-02-28 VITALS
TEMPERATURE: 98.3 F | HEART RATE: 55 BPM | WEIGHT: 170.4 LBS | OXYGEN SATURATION: 97 % | DIASTOLIC BLOOD PRESSURE: 81 MMHG | RESPIRATION RATE: 20 BRPM | BODY MASS INDEX: 29.25 KG/M2 | SYSTOLIC BLOOD PRESSURE: 122 MMHG

## 2019-02-28 LAB
ALBUMIN SERPL-MCNC: 3.5 G/DL (ref 3.5–5)
ALBUMIN/GLOB SERPL: 2.7 {RATIO} (ref 1.2–3.5)
ALP SERPL-CCNC: 22 U/L (ref 50–136)
ALT SERPL-CCNC: 13 U/L (ref 12–65)
ANION GAP SERPL CALC-SCNC: 8 MMOL/L (ref 7–16)
AST SERPL-CCNC: 4 U/L (ref 15–37)
BACTERIA SPEC CULT: NORMAL
BILIRUB SERPL-MCNC: 0.4 MG/DL (ref 0.2–1.1)
BUN SERPL-MCNC: 16 MG/DL (ref 6–23)
CALCIUM SERPL-MCNC: 7.7 MG/DL (ref 8.3–10.4)
CHLORIDE SERPL-SCNC: 111 MMOL/L (ref 98–107)
CO2 SERPL-SCNC: 24 MMOL/L (ref 21–32)
CREAT SERPL-MCNC: 0.62 MG/DL (ref 0.6–1)
GLOBULIN SER CALC-MCNC: 1.3 G/DL (ref 2.3–3.5)
GLUCOSE SERPL-MCNC: 141 MG/DL (ref 65–100)
GRAM STN SPEC: NORMAL
GRAM STN SPEC: NORMAL
MAGNESIUM SERPL-MCNC: 2.2 MG/DL (ref 1.8–2.4)
POTASSIUM SERPL-SCNC: 3.7 MMOL/L (ref 3.5–5.1)
PROT SERPL-MCNC: 4.8 G/DL (ref 6.3–8.2)
SERVICE CMNT-IMP: NORMAL
SODIUM SERPL-SCNC: 143 MMOL/L (ref 136–145)

## 2019-02-28 PROCEDURE — 86592 SYPHILIS TEST NON-TREP QUAL: CPT

## 2019-02-28 PROCEDURE — 99232 SBSQ HOSP IP/OBS MODERATE 35: CPT | Performed by: INTERNAL MEDICINE

## 2019-02-28 PROCEDURE — 74011250637 HC RX REV CODE- 250/637: Performed by: INTERNAL MEDICINE

## 2019-02-28 PROCEDURE — 97165 OT EVAL LOW COMPLEX 30 MIN: CPT

## 2019-02-28 PROCEDURE — 97530 THERAPEUTIC ACTIVITIES: CPT

## 2019-02-28 PROCEDURE — 36415 COLL VENOUS BLD VENIPUNCTURE: CPT

## 2019-02-28 PROCEDURE — 80053 COMPREHEN METABOLIC PANEL: CPT

## 2019-02-28 PROCEDURE — 83735 ASSAY OF MAGNESIUM: CPT

## 2019-02-28 RX ORDER — VALACYCLOVIR HYDROCHLORIDE 1 G/1
1000 TABLET, FILM COATED ORAL DAILY
Qty: 30 TAB | Refills: 2 | Status: SHIPPED | OUTPATIENT
Start: 2019-03-01 | End: 2020-07-20 | Stop reason: ALTCHOICE

## 2019-02-28 RX ORDER — GABAPENTIN 100 MG/1
100 CAPSULE ORAL 3 TIMES DAILY
Qty: 90 CAP | Refills: 1 | Status: SHIPPED | OUTPATIENT
Start: 2019-02-28 | End: 2019-03-25 | Stop reason: DRUGHIGH

## 2019-02-28 RX ORDER — ALPRAZOLAM 1 MG/1
1 TABLET ORAL AS NEEDED
Qty: 1 TAB | Refills: 0 | Status: SHIPPED | OUTPATIENT
Start: 2019-02-28 | End: 2019-06-25 | Stop reason: ALTCHOICE

## 2019-02-28 RX ORDER — CALCIUM CARBONATE 200(500)MG
200 TABLET,CHEWABLE ORAL
Status: DISCONTINUED | OUTPATIENT
Start: 2019-02-28 | End: 2019-02-28 | Stop reason: HOSPADM

## 2019-02-28 RX ADMIN — Medication 10 ML: at 04:42

## 2019-02-28 RX ADMIN — GABAPENTIN 100 MG: 100 CAPSULE ORAL at 09:04

## 2019-02-28 RX ADMIN — VALACYCLOVIR HYDROCHLORIDE 1000 MG: 500 TABLET, FILM COATED ORAL at 09:04

## 2019-02-28 RX ADMIN — SERTRALINE HYDROCHLORIDE 50 MG: 50 TABLET ORAL at 09:04

## 2019-02-28 NOTE — PROGRESS NOTES
[facility] Hematology & Oncology        Inpatient Hematology / Oncology Daily Note    Reason for Consult:  Myelitis [facility]) [V64.05]  Referring Physician:  No att. providers found    24 Hour Events:  Afebrile, VSS  Plasmapheresis #2 of 5 yesterday  Next tx planned tomorrow - discharging home today, will complete tx as OP  Tolerating treatment well    ROS:  Constitutional: Negative for fever, chills. CV: Negative for chest pain, palpitations, edema. Respiratory: Negative for dyspnea, cough, wheezing. GI: Negative for nausea, abdominal pain, diarrhea. 10 point review of systems is otherwise negative with the exception of the elements mentioned above in the HPI.        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    positive on PAP    Multiple sclerosis ([facility] 01/2019    Rheumatoid arthritis ([facility]      Past Surgical History:   Procedure Laterality Date    IR INSERT NON TUNL CVC OVER 5 YRS  2/25/2019     Family History   Problem Relation Age of Onset    MS Mother     Diabetes Father         pre-diabetes    Thyroid Cancer Father     Thyroid Disease Sister         hypothyroidism    Liver Disease Brother         autoimmune hepatitis, had liver transplant    Thyroid Disease Brother         hypothyroidism    Thyroid Disease Sister         hypothyroidism    Heart Disease Brother         tachycardia    Breast Cancer Paternal Aunt     Heart Disease Maternal Aunt     Thyroid Disease Maternal Aunt         hyperthyroidism    Thyroid Disease Maternal Grandmother         hypothyroidism    Thyroid Disease Cousin         hypothyroidism     Social History     Socioeconomic History    Marital status:      Spouse name: Anna Betancourt Number of children: 3    Years of education: Not on file    Highest education level: Not on file   Social Needs    Financial resource strain: Not on file    Food insecurity - worry: Not on file   Jadyn-Rashad insecurity - inability: Not on file    Transportation needs - medical: Not on file   [employer] needs - non-medical: Not on file   Occupational History    Occupation: stay at home mom     Employer: NOT EMPLOYED   Tobacco Use    Smoking status: Never Smoker    Smokeless tobacco: Never Used   Substance and Sexual Activity    Alcohol use: Yes     Alcohol/week: 1.8 oz     Types: 3 Standard drinks or equivalent per week     Comment: occ    Drug use: No    Sexual activity: Yes     Partners: Male     Birth control/protection: Surgical   Other Topics Concern    Not on file   Social History Narrative    Chidren: Bj Hem (12/25/98), Kostas (2/21/06), Max (9/23/09).  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     Current Facility-Administered Medications   Medication Dose Route Frequency Provider Last Rate Last Dose    calcium carbonate (TUMS) chewable tablet 200 mg [elemental]  200 mg Oral TID WITH MEALS Mian Alvarez NP        sodium chloride (NS) flush 10-40 mL  10-40 mL IntraVENous PRN Willistine Cowden, MD        valACYclovir (VALTREX) tablet 1,000 mg  1,000 mg Oral DAILY Xavier Reynoso MD   1,000 mg at 02/28/19 5361    gabapentin (NEURONTIN) capsule 100 mg  100 mg Oral TID Yao Murillo MD   100 mg at 02/28/19 0904    sertraline (ZOLOFT) tablet 50 mg  50 mg Oral DAILY Laura Duarte MD   50 mg at 02/28/19 0904    sodium chloride (NS) flush 5-40 mL  5-40 mL IntraVENous Q8H Laura Duarte MD   10 mL at 02/28/19 0442    sodium chloride (NS) flush 5-40 mL  5-40 mL IntraVENous PRN Laura Duarte MD   30 mL at 02/27/19 1145    methylPREDNISolone (PF) (Solu-MEDROL) 500 mg in 0.9% sodium chloride 100 mL IVPB  500 mg IntraVENous Q12H Zo Mruillo  mL/hr at 02/27/19 2224 500 mg at 02/27/19 2224     Current Outpatient Medications   Medication Sig Dispense Refill    [START ON 3/1/2019] valACYclovir (VALTREX) 1 gram tablet Take 1 Tab by mouth daily.  30 Tab 2    ALPRAZolam Concepcion Kurtz) 1 mg tablet Take 1 Tab by mouth as needed for Anxiety. Max Daily Amount: 96 mg. Take 30 minutes to 1 hour prior to MRI 1 Tab 0    gabapentin (NEURONTIN) 100 mg capsule Take 1 Cap by mouth three (3) times daily. 90 Cap 1    LORazepam (ATIVAN) 0.5 mg tablet Take 30 min before procedure. Repeat in 30 min if needed 2 Tab 0    sertraline (ZOLOFT) 50 mg tablet Take 1 Tab by mouth daily. 90 Tab 1    cholecalciferol, vitamin D3, (VITAMIN D3) 2,000 unit tab Take  by mouth. OBJECTIVE:  Patient Vitals for the past 8 hrs:   BP Temp Pulse Resp SpO2   19 0721 122/81 98.3 °F (36.8 °C) (!) 55 20 97 %     Temp (24hrs), Av.3 °F (36.8 °C), Min:97.9 °F (36.6 °C), Max:98.6 °F (37 °C)     07 -  1900  In: 360 [P.O.:360]  Out: -     Physical Exam:  Constitutional: Well developed, well nourished female in no acute distress, sitting comfortably in the hospital bed. HEENT: Normocephalic and atraumatic. Oropharynx is clear, mucous membranes are moist.  Extraocular muscles are intact. Sclerae anicteric. Neck supple without JVD. No thyromegaly present. Lymph node   Deferred   Skin Warm and dry. No bruising and no rash noted. No erythema. No pallor. Respiratory Lungs are clear to auscultation bilaterally without wheezes, rales or rhonchi, normal air exchange without accessory muscle use. CVS Normal rate, regular rhythm and normal S1 and S2. No murmurs, gallops, or rubs. Abdomen Soft, nontender and nondistended, normoactive bowel sounds. No palpable mass. No hepatosplenomegaly. Neuro Grossly nonfocal with no obvious sensory or motor deficits. BUE/BLE strength 5/5. MSK Normal range of motion in general.  No edema and no tenderness. Psych Appropriate mood and affect.         Labs:    Recent Results (from the past 24 hour(s))   METABOLIC PANEL, COMPREHENSIVE    Collection Time: 19  4:13 AM   Result Value Ref Range    Sodium 143 136 - 145 mmol/L    Potassium 3.7 3.5 - 5.1 mmol/L    Chloride 111 (H) 98 - 107 mmol/L    CO2 24 21 - 32 mmol/L    Anion gap 8 7 - 16 mmol/L    Glucose 141 (H) 65 - 100 mg/dL    BUN 16 6 - 23 MG/DL    Creatinine 0.62 0.6 - 1.0 MG/DL    GFR est AA >60 >60 ml/min/1.73m2    GFR est non-AA >60 >60 ml/min/1.73m2    Calcium 7.7 (L) 8.3 - 10.4 MG/DL    Bilirubin, total 0.4 0.2 - 1.1 MG/DL    ALT (SGPT) 13 12 - 65 U/L    AST (SGOT) 4 (L) 15 - 37 U/L    Alk. phosphatase 22 (L) 50 - 136 U/L    Protein, total 4.8 (L) 6.3 - 8.2 g/dL    Albumin 3.5 3.5 - 5.0 g/dL    Globulin 1.3 (L) 2.3 - 3.5 g/dL    A-G Ratio 2.7 1.2 - 3.5     MAGNESIUM    Collection Time: 02/28/19  4:13 AM   Result Value Ref Range    Magnesium 2.2 1.8 - 2.4 mg/dL   RPR    Collection Time: 02/28/19  4:13 AM   Result Value Ref Range    RPR PENDING        Imaging:  MRI CERVICAL SPINE WITHOUT CONTRAST.     COMPARISON: Herpes zoster, cord signal abnormality and extremity weakness.     HISTORY: 26 January 2019.     TECHNIQUE: Sagittal T1 and T2-weighted images, axial T2 and gradient-echo  images.        FINDINGS: The focal cord signal abnormality centered at the C5 level remains. There is now distinct contrast enhancement following gadolinium, best seen on  the axial images. No new cord lesions.     Spinal alignment anatomic. Included posterior fossa structures unremarkable. Bone marrow signal intensity within normal limits.      C2-3: No focal disc herniation or stenosis. C3-4: No focal disc herniation or stenosis. C4-5: No focal disc herniation or stenosis. C5-6: No focal disc herniation or stenosis. C6-7: No focal disc herniation or stenosis. C7-T1: No focal disc herniation or stenosis.     IMPRESSION  IMPRESSION: Focal cord signal abnormality at the C5 level appears similar in  size. There is now clearly contrast enhancement following gadolinium, better  seen on the axial images. No contrast-enhancement appreciated on the prior  study. No new cord lesions.  Differential diagnosis remains the same, transverse  myelitis versus demyelination versus neoplasia. 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 2  Unknown        Goiter ICD-10-CM: E04.9  ICD-9-CM: 240.9  6/18/2018        Family history of thyroid cancer ICD-10-CM: Z80.8  ICD-9-CM: V16.8  6/18/2018            Ms. Devorah Pedraza is a 37 y.o. female admitted on 2/23/2019. The primary encounter diagnosis was Myelitis ([facility] Diagnoses of History of plasmapheresis, History of shingles, and Paresthesia of both legs were also pertinent to this visit. Her PMH includes hashimotos thyroiditis. She was recently admitted from 1/26 - 1/31 for what was felt to be new MS flare. Had neuro w/u with normal NCV, MRI C-s[ine with focal T2 abnormality at C5, MRI brain with R frontal centrum ovale lesion, and LP with elevated VZV Ab. Was treated with acyclovir and steroids. She returned on 2/23 with recurrent LE and UE paresthesia. F/u MRI c-spine with focal C5 lesion. She was admitted for ongoing myelitis. LP performed 2/23. We were consulted for apheresis. RECOMMENDATIONS:  Myelitis  - On ACV, Solumedrol  - Per neuro, apheresis every other day x 5 treatments - coordinator contacted to begin today  - IR consulted for temp pheresis line  - Labs ordered per treatment plan  2/26 Rec'd first apheresis treatment yesterday, tolerated well. Next treatment due tomorrow. 2/27 Plasmapheresis #2 of 5 today. Next tx planned for 2/29. Tolerating well. Fibrinogen down to 104. Most likely will need FFP after next treatment. 2/28 Tolerated tx well yesterday. Discharging home today, will continue next tx as OP. Thank you for allowing us to participate in the care of Ms. Iqbal. If patient requires more than the 5 treatments, Dr. Jayda Chamberlain would like to see her for follow up in 1-2 months. Pepper Chen NP   [facility]  [address] [place], [place] [id number] [facility]  Office : [phone]  Fax : [phone]         Attending Addendum:  I have personally performed a face to face diagnostic evaluation on this patient. I have reviewed and agree with the care plan as documented above by Pepper Chen N.P.  My findings are as follows: Patient appears lethargic, heart rate regular without murmurs, abdomen is non-tender, bowel sounds are positive. Middle aged lady admitted w myelitis, and recommended to undergo plasmapheresis per neurology. Central line placed. Procedure discussed in detail w/ patient. Will plan to initiate every other day x 5 per neuro rec's. Closely monitor for tolerance. Tolerating plasmapheresis. Fibrinogen on lower side and will be monitored. For discharge today. If continued plasmapheresis needs over next few months then a tunnelled catheter will need to be considered. She will be seen in hematology in 2 months or so in such a case. Total time 25 min, over 50% in direct consultation.          Deep Dickerson MD  [facility]  [address] [place], [place] [id number] [facility]  Office : [phone]  Fax : [phone]

## 2019-02-28 NOTE — DISCHARGE INSTRUCTIONS
DISCHARGE SUMMARY from Nurse    PATIENT INSTRUCTIONS:    After general anesthesia or intravenous sedation, for 24 hours or while taking prescription Narcotics:  · Limit your activities  · Do not drive and operate hazardous machinery  · Do not make important personal or business decisions  · Do  not drink alcoholic beverages  · If you have not urinated within 8 hours after discharge, please contact your surgeon on call. Report the following to your surgeon:  · Excessive pain, swelling, redness or odor of or around the surgical area  · Temperature over 100.5  · Nausea and vomiting lasting longer than 4 hours or if unable to take medications  · Any signs of decreased circulation or nerve impairment to extremity: change in color, persistent  numbness, tingling, coldness or increase pain  · Any questions    What to do at Home:  Recommended activity: Activity as tolerated, per MD instructions    If you experience any of the following symptoms fever > 100.5, nausea, vomiting, pain, chest pain and/or shortness of breath to ER please follow up with MD.    *  Please give a list of your current medications to your Primary Care Provider. *  Please update this list whenever your medications are discontinued, doses are      changed, or new medications (including over-the-counter products) are added. *  Please carry medication information at all times in case of emergency situations. These are general instructions for a healthy lifestyle:    No smoking/ No tobacco products/ Avoid exposure to second hand smoke  Surgeon General's Warning:  Quitting smoking now greatly reduces serious risk to your health.     Obesity, smoking, and sedentary lifestyle greatly increases your risk for illness    A healthy diet, regular physical exercise & weight monitoring are important for maintaining a healthy lifestyle    You may be retaining fluid if you have a history of heart failure or if you experience any of the following symptoms: Weight gain of 3 pounds or more overnight or 5 pounds in a week, increased swelling in our hands or feet or shortness of breath while lying flat in bed. Please call your doctor as soon as you notice any of these symptoms; do not wait until your next office visit. Recognize signs and symptoms of STROKE:    F-face looks uneven    A-arms unable to move or move unevenly    S-speech slurred or non-existent    T-time-call 911 as soon as signs and symptoms begin-DO NOT go       Back to bed or wait to see if you get better-TIME IS BRAIN. Warning Signs of HEART ATTACK     Call 911 if you have these symptoms:   Chest discomfort. Most heart attacks involve discomfort in the center of the chest that lasts more than a few minutes, or that goes away and comes back. It can feel like uncomfortable pressure, squeezing, fullness, or pain.  Discomfort in other areas of the upper body. Symptoms can include pain or discomfort in one or both arms, the back, neck, jaw, or stomach.  Shortness of breath with or without chest discomfort.  Other signs may include breaking out in a cold sweat, nausea, or lightheadedness. Don't wait more than five minutes to call 911 - MINUTES MATTER! Fast action can save your life. Calling 911 is almost always the fastest way to get lifesaving treatment. Emergency Medical Services staff can begin treatment when they arrive -- up to an hour sooner than if someone gets to the hospital by car. The discharge information has been reviewed with the patient. The patient verbalized understanding. Discharge medications reviewed with the patient and appropriate educational materials and side effects teaching were provided. ___________________________________________________________________________________________________________________________________    Patient Education        Encephalitis: Care Instructions  Your Care Instructions    Encephalitis is a swelling (inflammation) in the brain.  It is usually caused by a virus. The swelling changes the normal blood flow to the brain. This can cause confusion, a high fever, and a severe headache. You also may be sleepy and sensitive to light. And you may have nausea and a stiff neck and back. In more serious cases, a person may have seizures or tremors. Your doctor may give you medicine to help your body get rid of the virus. You may have some symptoms for several weeks or longer while your body slowly heals. Your doctor may recommend physical therapy. This can help you get stronger and active again. Follow-up care is a key part of your treatment and safety. Be sure to make and go to all appointments, and call your doctor if you are having problems. It's also a good idea to know your test results and keep a list of the medicines you take. How can you care for yourself at home? · Take an over-the-counter pain medicine, such as acetaminophen (Tylenol), ibuprofen (Advil, Motrin), or naproxen (Aleve), for pain and discomfort. Be safe with medicines. Read and follow all instructions on the label. · Do not take two or more pain medicines at the same time unless the doctor told you to. Many pain medicines have acetaminophen, which is Tylenol. Too much acetaminophen (Tylenol) can be harmful. · Take your medicines exactly as prescribed. Call your doctor if you think you are having a problem with your medicine. You will get more details on the specific medicines your doctor prescribes. · Follow your doctor's advice on drinking fluids. · Eat a healthy, balanced diet and get plenty of rest. This helps your body heal.  · Keep the lights dim if your eyes are sensitive to light. When should you call for help? Call 911 anytime you think you may need emergency care.  For example, call if:    · You have a seizure.    Call your doctor now or seek immediate medical care if:    · You have a fever.     · You have a severe headache.     · You have a stiff neck.     · You are nauseated or are vomiting.     · You are confused or cannot think clearly.    Watch closely for changes in your health, and be sure to contact your doctor if:    · You notice new numbness or weakness.     · You do not get better as expected. Where can you learn more? Go to http://bettie-zeinab.info/. Enter T548 in the search box to learn more about \"Encephalitis: Care Instructions. \"  Current as of: July 30, 2018  Content Version: 11.9  © 8001-2743 [employer]. Care instructions adapted under license by [employer] (which disclaims liability or warranty for this information). If you have questions about a medical condition or this instruction, always ask your healthcare professional. [address] any warranty or liability for your use of this information.

## 2019-02-28 NOTE — PROGRESS NOTES
1. Patient will complete functional mobility for household distances with MOD I and adaptive equipment as needed. - MET    Total Visits: 1 visit      OCCUPATIONAL THERAPY: Initial Assessment, Daily Note, Discharge and AM 2/28/2019  INPATIENT:    Payor: Carmen Severance / Plan: [address] / Product Type: PPO /      NAME/AGE/GENDER: Sixto Handler is a 37 y.o. female   PRIMARY DIAGNOSIS:  Myelitis ([address] [G04.91] Myelitis ([address] Myelitis ([address]       ICD-10: Treatment Diagnosis:    · Generalized Muscle Weakness (M62.81)   Precautions/Allergies:    fall precautions   Patient has no known allergies. ASSESSMENT:     Ms. Carin Donnelly presents myelitis with paresthesia. Upon arrival, pt supine in bed and agreeable to OT evaluation. Pt alert and oriented x 4. Pt reports living with  and 2 children in a 2-story home with living accommodations on the 2nd level with 13 steps and bilateral hand rails. At baseline, pt reports independence with ADLs, IADLs, work tasks, driving, and functional mobility, with no use of any DME. Today, pt completed all functional transfers independently. Pt able to charity/doff tennis shoes independently. Pt ambulated 500 ft in hallway with supervision. Pt returned to room for UE testing. Pt's BUE AROM and strength are WFL; coordination intact. Pt does note sensory discrepancies primarily in R hand. Upon sensory testing, pt able to distinguish between light/dull/sharp touch in R hand, however notes sensation feels diminished when compared to L hand. Pt returned to supine in bed with needs met and call light within reach. At this time, pt is functioning at baseline for ADLs and functional mobility. Pt to be discharged from OT services at this time. This section established at most recent assessment   PROBLEM LIST (Impairments causing functional limitations):  1.  Decreased Strength   INTERVENTIONS PLANNED: (Benefits and precautions of occupational therapy have been discussed with the patient.)  1. None       TREATMENT PLAN: Frequency/Duration: discharge  Rehabilitation Potential For Stated Goals:      RECOMMENDED REHABILITATION/EQUIPMENT: (at time of discharge pending progress): Due to the probability of continued deficits (see above) this patient will not likely need continued skilled occupational therapy after discharge. Equipment:    None at this time              OCCUPATIONAL PROFILE AND HISTORY:   History of Present Injury/Illness (Reason for Referral):  See H&P  Past Medical History/Comorbidities:   Ms. 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 Ms. Tanya Wolfe  has a past surgical history that includes ir insert non tunl cvc over 5 yrs (2/25/2019). Social History/Living Environment:   Home Environment: Private residence  # Steps to Enter: 3  One/Two Story Residence: Two story  # of Interior Steps: 15  Interior Rails: Both  Living Alone: No  Support Systems: Child(sherrill), Spouse/Significant Other/Partner  Patient Expects to be Discharged to[employer] residence  Current DME Used/Available at Home: None  Prior Level of Function/Work/Activity:  Independent with ADLs, IADLs, functional mobility, driving, and work tasks. Dominant Side:         RIGHT  Personal Factors:          Sex:  female        Age:  37 y.o.         Other factors that influence how disability is experienced by the patient:  Multiple co-morbidities    Number of Personal Factors/Comorbidities that affect the Plan of Care: Brief history (0):  LOW COMPLEXITY   ASSESSMENT OF OCCUPATIONAL PERFORMANCE[de-identified]   Activities of Daily Living:   Basic ADLs (From Assessment) Complex ADLs (From Assessment)   Feeding: Independent  Oral Facial Hygiene/Grooming: Independent  Bathing: Independent  Upper Body Dressing: Independent  Lower Body Dressing: Independent  Toileting: Independent Instrumental ADL  Meal Preparation: Minimum assistance  Homemaking: Minimum assistance  Medication Management: Minimum assistance   Grooming/Bathing/Dressing Activities of Daily Living     Cognitive Retraining  Safety/Judgement: Awareness of environment                       Bed/Mat Mobility  Rolling: Independent  Supine to Sit: Independent  Sit to Supine: Independent  Sit to Stand: Independent  Scooting: Independent       Most Recent Physical Functioning:   Gross Assessment:  AROM: Within functional limits  Strength: Within functional limits  Coordination: Within functional limits  Tone: Normal  Sensation: Impaired(R hand)               Posture:     Balance:  Sitting: Intact  Standing: Intact Bed Mobility:  Rolling: Independent  Supine to Sit: Independent  Sit to Supine: Independent  Scooting: Independent  Wheelchair Mobility:     Transfers:  Sit to Stand: Independent  Stand to Sit: Independent            Patient Vitals for the past 6 hrs:   BP BP Patient Position SpO2 Pulse   02/28/19 0721 122/81 At rest 97 % (!) 55       Mental Status  Neurologic State: Alert  Orientation Level: Oriented X4  Cognition: Appropriate decision making, Follows commands  Perception: Appears intact  Perseveration: No perseveration noted  Safety/Judgement: Awareness of environment                          Physical Skills Involved:  1. Sensation Cognitive Skills Affected (resulting in the inability to perform in a timely and safe manner): 1. none Psychosocial Skills Affected:  1. none   Number of elements that affect the Plan of Care: 1-3:  LOW COMPLEXITY   CLINICAL DECISION MAKING:   [employer] AM-PAC 6 Clicks   Daily Activity Inpatient Short Form  How much help from another person does the patient currently need. .. Total A Lot A Little None   1. Putting on and taking off regular lower body clothing? [] 1   [] 2   [] 3   [x] 4   2. Bathing (including washing, rinsing, drying)? [] 1   [] 2   [] 3   [x] 4   3.   Toileting, which includes using toilet, bedpan or urinal?   [] 1   [] 2   [] 3   [x] 4   4. Putting on and taking off regular upper body clothing? [] 1   [] 2   [] 3   [x] 4   5. Taking care of personal grooming such as brushing teeth? [] 1   [] 2   [] 3   [x] 4   6. Eating meals? [] 1   [] 2   [] 3   [x] 4   © 2007, Trustees of [facility] [employer], under license to [employer]. All rights reserved      Score:  Initial: 24 Most Recent: X (Date: -- )    Interpretation of Tool:  Represents activities that are increasingly more difficult (i.e. Bed mobility, Transfers, Gait). Medical Necessity:     · discharge  Reason for Services/Other Comments:  · discharge   Use of outcome tool(s) and clinical judgement create a POC that gives a: LOW COMPLEXITY         TREATMENT:   (In addition to Assessment/Re-Assessment sessions the following treatments were rendered)     Pre-treatment Symptoms/Complaints:  \"I feel good. .. I am ready to go home. \"  Pain: Initial:   Pain Intensity 1: 0 /10 Post Session:  same     Therapeutic Activity: (    10 minutes): Therapeutic activities including Ambulation on level ground to improve mobility, strength and balance. Required no   to promote dynamic balance in standing. Pt ambulated 500 ft in hallway with supervision. Pt had one balance check, however was able to self-correct. Braces/Orthotics/Lines/Etc:   · O2 Device: Room air  Treatment/Session Assessment:    · Response to Treatment:  Tolerated well with no issues noted.    · Interdisciplinary Collaboration:   o Occupational Therapist  o Registered Nurse  · After treatment position/precautions:   o Supine in bed  o Call light within reach   · Compliance with Program/Exercises:  · Recommendations/Intent for next treatment session:    Total Treatment Duration:  OT Patient Time In/Time Out  Time In: 1005  Time Out: [facility]

## 2019-02-28 NOTE — PROGRESS NOTES
Discharge instructions and prescriptions given and reviewed with pt, verbalizes understanding, pt discharged home with family. Awaiting for MD to call back in regards to script.

## 2019-02-28 NOTE — DISCHARGE SUMMARY
Hospitalist Discharge Summary     Patient ID:  Cornelius Lan  [id number]  51 y.o.  1975  Admit date: 2/23/2019  8:50 AM  Discharge date and time: 2/28/2019  Attending: Sujatha Astorga DO  PCP:  Maria G Alcantara MD  Treatment Team: Attending Provider: Yarelis De La O; Consulting Provider: Agustin Baum MD; Utilization Review: Raul Garrett RN; Consulting Provider: Jose Juan Orellana MD; Care Manager: Marily De La Rosa    Principal Diagnosis Myelitis [facility])    Hospital Problems as of 2/28/2019 Date Reviewed: 1/28/2019          Codes Class Noted - Resolved POA    * (Principal) Myelitis ([facility] ICD-10-CM: G04.91  ICD-9-CM: 323.9  2/23/2019 - Present Yes        History of shingles ICD-10-CM: Z86.19  ICD-9-CM: V12.09  1/29/2019 - Present Yes                  Hospital Course:  38 yo female with PMH of varicella zoster, hashimotos thyroiditis and admit 2,2019 for what was felt to be a new multiple sclerosis flare. She began with complaints of LE paresthesia. She had outpatient neurology workup with normal NCV. MRI cspine showed focal T2 abnormality at C5. MRI brain showed right frontal centrum ovale lesion. She had LP that showed elevated VZV Ab and was managed with acyclovir. She was also managed with IV steroids. She was discharged home with outpatient neurology followup and began to have recurrent LE and right UE paresthesia. She was re-evaluated by neurology with followup MRI cspine showing increase of the focal C5 lesion. She has been referred to direct admit for concern for ongoing myelitis. She got a repeat LP, viral PCR negative, negative bacterial cultures. She was been restarted on IV steroids and acyclovir--> oral valtrex. Neuro and ID followed in the hospitatl. She was started on plasmpheresis so hematology was consulted. IR placed apheresis line on 2-25-19 and she will have 5 treatments over 10 days, EOT 3-6-19. Also pelvic US showed right ovarian cyst and ABD US negative.  She is up to date with age related cancer screenings/ has pending outpatient GYN followup. She remained stable and was discharged home. She will continue Valacyclovir 1g daily x 3-6 month for prophylaxis. She will get a repeat MRI Cervical spine on 3/11/19 and follow up with neurology afterwards. Significant Diagnostic Studies:    Labs: Results:       Chemistry Recent Labs     02/28/19 0413 02/27/19 0449 02/26/19  1249   * 139* 188*    143 142   K 3.7 4.2 3.8   * 110* 108*   CO2 24 25 26   BUN 16 15 14   CREA 0.62 0.59* 0.78   CA 7.7* 8.4 8.5   AGAP 8 8 8   AP 22* 33* 39*   TP 4.8* 5.3* 6.0*   ALB 3.5 3.5 4.3   GLOB 1.3* 1.8* 1.7*   AGRAT 2.7 1.9 2.5      CBC w/Diff Recent Labs     02/27/19 0449 02/25/19  1212   WBC 9.6 16.4*   RBC 4.15 4.14   HGB 12.8 12.7   HCT 38.8 38.4    288   GRANS 84* 88*   LYMPH 10* 5*   EOS 0* 0*      Cardiac Enzymes No results for input(s): CPK, CKND1, PALMIRA in the last 72 hours. No lab exists for component: CKRMB, TROIP   Coagulation Recent Labs     02/27/19 0449 02/25/19  1212   APTT 28.0 27.8       Lipid Panel Lab Results   Component Value Date/Time    Cholesterol, total 157 05/21/2018 10:05 AM    HDL Cholesterol 69 05/21/2018 10:05 AM    LDL, calculated 80 05/21/2018 10:05 AM    VLDL, calculated 8 05/21/2018 10:05 AM    Triglyceride 39 05/21/2018 10:05 AM      BNP No results for input(s): BNPP in the last 72 hours. Liver Enzymes Recent Labs     02/28/19 0413   TP 4.8*   ALB 3.5   AP 22*   SGOT 4*      Thyroid Studies Lab Results   Component Value Date/Time    TSH 3.880 01/26/2019 08:59 PM            Imaging:  Us Abd Comp    Result Date: 2/25/2019  IMPRESSION: Unremarkable abdominal ultrasound     Ir Insert Non Tunl Cvc Over 5 Yrs    Result Date: 2/25/2019  Impression:  Technically successful temporary pheresis catheter placement. Plan:  Catheter is ready for use. Us Pelv Non Ob W Tv    Result Date: 2/26/2019  IMPRESSION: 1.  Complex cyst of the right ovary measuring 2 cm. Follow-up in 6 weeks recommended, a different point in the patient's cycle. 2. Trace pelvic free fluid. Microbiology/Cultures: All Micro Results     Procedure Component Value Units Date/Time    TUBE 2 - CULTURE WITH KATTY Anna [[id number]] Collected:  02/23/19 1128    Order Status:  Completed Specimen:  Cerebrospinal Fluid Updated:  02/28/19 0839     Special Requests: TUBE 2, CULTURE AND SENSITIVTY AND GRAM STAIN. GRAM STAIN NO WBCS SEEN         NO DEFINITE ORGANISM SEEN        Culture result: NO GROWTH 5 DAYS       HSV 1 AND 2 PCR [[id number]] Collected:  02/23/19 1047    Order Status:  Completed Specimen:  Other from Whole Blood Updated:  02/27/19 1001     Specimen source BLOOD        HSV 1       RESULTS SCANNED IN [facility]           Comment: PERFORMED BY [facility]        HSV 2       RESULTS SCANNED IN [facility]           Comment: PERFORMED BY [facility]       MENINGITIS PATHOGENS PANEL (BY PCR), CSF [[id number]] Collected:  02/23/19 1128    Order Status:  Completed Specimen:  Cerebrospinal Fluid Updated:  02/24/19 0626     Meningitis panel       RESULTS SCANNED IN [facility]          CMV BY PCR, QT, BAL [[id number]] Collected:  02/23/19 1137    Order Status:  Canceled     TUBE 1 - HOLD CSF [[id number]]     Order Status:  Sent Specimen:  Cerebrospinal Fluid           Discharge Exam:  Visit Vitals  /81 (BP 1 Location: Right arm, BP Patient Position: At rest)   Pulse (!) 55   Temp 98.3 °F (36.8 °C)   Resp 20   Wt 77.3 kg (170 lb 6.4 oz) Comment: using bed   SpO2 97%   BMI 29.25 kg/m²     General appearance: alert, cooperative, no distress, appears stated age  Lungs: clear to auscultation bilaterally  Heart: regular rate and rhythm, S1, S2 normal, no murmur, click, rub or gallop  Abdomen: soft, non-tender.  Bowel sounds normal. No masses,  no organomegaly  Extremities: no cyanosis or edema  Neurologic: Grossly normal    Disposition: home  Discharge Condition: stable  Patient Instructions:   Current Discharge Medication List      START taking these medications    Details   valACYclovir (VALTREX) 1 gram tablet Take 1 Tab by mouth daily. Qty: 30 Tab, Refills: 2      ALPRAZolam (XANAX) 1 mg tablet Take 1 Tab by mouth as needed for Anxiety. Max Daily Amount: 96 mg. Take 30 minutes to 1 hour prior to MRI  Qty: 1 Tab, Refills: 0    Associated Diagnoses: Claustrophobia         CONTINUE these medications which have NOT CHANGED    Details   LORazepam (ATIVAN) 0.5 mg tablet Take 30 min before procedure. Repeat in 30 min if needed  Qty: 2 Tab, Refills: 0    Associated Diagnoses: Claustrophobia      gabapentin (NEURONTIN) 100 mg capsule Take 1 Cap by mouth three (3) times daily. Qty: 60 Cap, Refills: 0    Associated Diagnoses: Postherpetic neuralgia      sertraline (ZOLOFT) 50 mg tablet Take 1 Tab by mouth daily. Qty: 90 Tab, Refills: 1    Associated Diagnoses: Anxiety      cholecalciferol, vitamin D3, (VITAMIN D3) 2,000 unit tab Take  by mouth.          STOP taking these medications       predniSONE (DELTASONE) 20 mg tablet Comments:   Reason for Stopping:               Activity: Activity as tolerated  Diet: Regular Diet  Wound Care: None needed    Follow-up  ·   Dr. Adrian Shankar in one week  · Dr. Princess Arenas in 2 weeks  · Dr. Wayne Dover in 3 months  Time spent to discharge patient 35 minutes  Signed:  Pat Yates DO  2/28/2019  9:58 AM

## 2019-03-01 ENCOUNTER — HOSPITAL ENCOUNTER (OUTPATIENT)
Dept: INFUSION THERAPY | Age: 44
Discharge: HOME | End: 2019-03-01
Payer: COMMERCIAL

## 2019-03-01 VITALS
BODY MASS INDEX: 28.17 KG/M2 | WEIGHT: 165 LBS | SYSTOLIC BLOOD PRESSURE: 106 MMHG | TEMPERATURE: 97.9 F | OXYGEN SATURATION: 99 % | HEART RATE: 74 BPM | HEIGHT: 64 IN | RESPIRATION RATE: 18 BRPM | DIASTOLIC BLOOD PRESSURE: 61 MMHG

## 2019-03-01 DIAGNOSIS — G04.91 MYELITIS (HCC): Primary | ICD-10-CM

## 2019-03-01 LAB
ABO + RH BLD: NORMAL
ALBUMIN SERPL-MCNC: 3.6 G/DL (ref 3.5–5)
ALBUMIN/GLOB SERPL: 2.4 {RATIO} (ref 1.2–3.5)
ALP SERPL-CCNC: 32 U/L (ref 50–136)
ALT SERPL-CCNC: 21 U/L (ref 12–65)
ANION GAP SERPL CALC-SCNC: 5 MMOL/L (ref 7–16)
APTT PPP: 26.2 SEC (ref 24.7–39.8)
AST SERPL-CCNC: 6 U/L (ref 15–37)
BASOPHILS # BLD: 0.1 K/UL (ref 0–0.2)
BASOPHILS NFR BLD: 1 % (ref 0–2)
BILIRUB SERPL-MCNC: 0.7 MG/DL (ref 0.2–1.1)
BUN SERPL-MCNC: 18 MG/DL (ref 6–23)
CALCIUM SERPL-MCNC: 7.8 MG/DL (ref 8.3–10.4)
CHLORIDE SERPL-SCNC: 109 MMOL/L (ref 98–107)
CO2 SERPL-SCNC: 27 MMOL/L (ref 21–32)
CREAT SERPL-MCNC: 0.75 MG/DL (ref 0.6–1)
DIFFERENTIAL METHOD BLD: ABNORMAL
EOSINOPHIL # BLD: 0.1 K/UL (ref 0–0.8)
EOSINOPHIL NFR BLD: 1 % (ref 0.5–7.8)
ERYTHROCYTE [DISTWIDTH] IN BLOOD BY AUTOMATED COUNT: 13.2 % (ref 11.9–14.6)
FIBRINOGEN PPP-MCNC: 80 MG/DL (ref 190–501)
GLOBULIN SER CALC-MCNC: 1.5 G/DL (ref 2.3–3.5)
GLUCOSE SERPL-MCNC: 91 MG/DL (ref 65–100)
HCT VFR BLD AUTO: 40.3 % (ref 35.8–46.3)
HGB BLD-MCNC: 13.8 G/DL (ref 11.7–15.4)
IGG SERPL-MCNC: 780 MG/DL (ref 700–1600)
IGG1 SER-MCNC: 348 MG/DL (ref 248–810)
IGG2 SER-MCNC: 354 MG/DL (ref 130–555)
IGG3 SER-MCNC: 34 MG/DL (ref 15–102)
IGG4 SER-MCNC: 16 MG/DL (ref 2–96)
IMM GRANULOCYTES # BLD AUTO: 1 K/UL (ref 0–0.5)
IMM GRANULOCYTES NFR BLD AUTO: 9 % (ref 0–5)
LYMPHOCYTES # BLD: 3.2 K/UL (ref 0.5–4.6)
LYMPHOCYTES NFR BLD: 29 % (ref 13–44)
MAGNESIUM SERPL-MCNC: 2.2 MG/DL (ref 1.8–2.4)
MCH RBC QN AUTO: 31.1 PG (ref 26.1–32.9)
MCHC RBC AUTO-ENTMCNC: 34.2 G/DL (ref 31.4–35)
MCV RBC AUTO: 90.8 FL (ref 79.6–97.8)
MONOCYTES # BLD: 1.5 K/UL (ref 0.1–1.3)
MONOCYTES NFR BLD: 14 % (ref 4–12)
NEUTS SEG # BLD: 5.1 K/UL (ref 1.7–8.2)
NEUTS SEG NFR BLD: 46 % (ref 43–78)
NRBC # BLD: 0 K/UL (ref 0–0.2)
PLATELET # BLD AUTO: 224 K/UL (ref 150–450)
PLATELET COMMENTS,PCOM: ADEQUATE
PMV BLD AUTO: 9.8 FL (ref 9.4–12.3)
POTASSIUM SERPL-SCNC: 3.6 MMOL/L (ref 3.5–5.1)
PROT SERPL-MCNC: 5.1 G/DL (ref 6.3–8.2)
RBC # BLD AUTO: 4.44 M/UL (ref 4.05–5.25)
RBC MORPH BLD: ABNORMAL
RPR SER QL: NONREACTIVE
SODIUM SERPL-SCNC: 141 MMOL/L (ref 136–145)
WBC # BLD AUTO: 11 K/UL (ref 4.3–11.1)
WBC MORPH BLD: ABNORMAL

## 2019-03-01 PROCEDURE — 85384 FIBRINOGEN ACTIVITY: CPT

## 2019-03-01 PROCEDURE — 36514 APHERESIS PLASMA: CPT

## 2019-03-01 PROCEDURE — 83735 ASSAY OF MAGNESIUM: CPT

## 2019-03-01 PROCEDURE — 80053 COMPREHEN METABOLIC PANEL: CPT

## 2019-03-01 PROCEDURE — 74011250636 HC RX REV CODE- 250/636: Performed by: INTERNAL MEDICINE

## 2019-03-01 PROCEDURE — 85025 COMPLETE CBC W/AUTO DIFF WBC: CPT

## 2019-03-01 PROCEDURE — P9059 PLASMA, FRZ BETWEEN 8-24HOUR: HCPCS

## 2019-03-01 PROCEDURE — 74011250637 HC RX REV CODE- 250/637: Performed by: INTERNAL MEDICINE

## 2019-03-01 PROCEDURE — 36430 TRANSFUSION BLD/BLD COMPNT: CPT

## 2019-03-01 PROCEDURE — 86900 BLOOD TYPING SEROLOGIC ABO: CPT

## 2019-03-01 PROCEDURE — 85730 THROMBOPLASTIN TIME PARTIAL: CPT

## 2019-03-01 PROCEDURE — P9045 ALBUMIN (HUMAN), 5%, 250 ML: HCPCS | Performed by: INTERNAL MEDICINE

## 2019-03-01 RX ORDER — HEPARIN 100 UNIT/ML
300 SYRINGE INTRAVENOUS AS NEEDED
Status: DISPENSED | OUTPATIENT
Start: 2019-03-01 | End: 2019-03-01

## 2019-03-01 RX ORDER — ALBUMIN HUMAN 50 G/1000ML
2500 SOLUTION INTRAVENOUS ONCE
Status: COMPLETED | OUTPATIENT
Start: 2019-03-01 | End: 2019-03-01

## 2019-03-01 RX ORDER — ACETAMINOPHEN 325 MG/1
650 TABLET ORAL
Status: COMPLETED | OUTPATIENT
Start: 2019-03-01 | End: 2019-03-01

## 2019-03-01 RX ORDER — SODIUM CHLORIDE 9 MG/ML
1000 INJECTION, SOLUTION INTRAVENOUS ONCE
Status: COMPLETED | OUTPATIENT
Start: 2019-03-01 | End: 2019-03-01

## 2019-03-01 RX ORDER — SODIUM CHLORIDE 0.9 % (FLUSH) 0.9 %
10 SYRINGE (ML) INJECTION AS NEEDED
Status: ACTIVE | OUTPATIENT
Start: 2019-03-01 | End: 2019-03-01

## 2019-03-01 RX ORDER — DIPHENHYDRAMINE HCL 25 MG
25 CAPSULE ORAL
Status: COMPLETED | OUTPATIENT
Start: 2019-03-01 | End: 2019-03-01

## 2019-03-01 RX ORDER — SODIUM CHLORIDE 9 MG/ML
25 INJECTION, SOLUTION INTRAVENOUS CONTINUOUS
Status: ACTIVE | OUTPATIENT
Start: 2019-03-01 | End: 2019-03-01

## 2019-03-01 RX ADMIN — HEPARIN 300 UNITS: 100 SYRINGE at 13:55

## 2019-03-01 RX ADMIN — SODIUM CHLORIDE 1000 ML: 900 INJECTION, SOLUTION INTRAVENOUS at 11:50

## 2019-03-01 RX ADMIN — Medication 10 ML: at 11:45

## 2019-03-01 RX ADMIN — ALBUMIN (HUMAN) 125 G: 12.5 INJECTION, SOLUTION INTRAVENOUS at 11:50

## 2019-03-01 RX ADMIN — ACETAMINOPHEN 650 MG: 325 TABLET, FILM COATED ORAL at 11:31

## 2019-03-01 RX ADMIN — Medication 10 ML: at 13:50

## 2019-03-01 RX ADMIN — SODIUM CHLORIDE 25 ML/HR: 900 INJECTION, SOLUTION INTRAVENOUS at 11:50

## 2019-03-01 RX ADMIN — DIPHENHYDRAMINE HYDROCHLORIDE 25 MG: 25 CAPSULE ORAL at 11:31

## 2019-03-01 RX ADMIN — Medication 10 ML: at 13:55

## 2019-03-01 RX ADMIN — CALCIUM GLUCONATE 2 G: 98 INJECTION, SOLUTION INTRAVENOUS at 11:50

## 2019-03-01 RX ADMIN — Medication 10 ML: at 11:50

## 2019-03-01 RX ADMIN — Medication 10 ML: at 13:45

## 2019-03-01 NOTE — PROGRESS NOTES
Problem: Knowledge Deficit  Goal: *Verbalizes understanding of procedures and medications  Outcome: Progressing Towards Goal  Verbalizes/demonstrates understanding of purpose/procedure/potential side effects of plasma exchange.

## 2019-03-01 NOTE — PROGRESS NOTES
Procedure: Therapeutic Plasmapheresis  Dx: Myelitis  Day: 3 of 5  Labs drawn: CBC,CMP, Mag, PTT, Fibrinogen  Fibrinogen: 80  Calcium used:  2 grams  Replacement product: Albumin and 2 units of FFP  Replace volume: 3216 /  Plasma Remove volume: 3498  Fluid Balance: 100%  TPV: 1.2  Issues: none, pt tolerated well. Next procedure date: 3/3/19  Labs needed: same as above  Pt tolerated well.

## 2019-03-02 LAB
BLD PROD TYP BPU: NORMAL
BLD PROD TYP BPU: NORMAL
BPU ID: NORMAL
BPU ID: NORMAL
STATUS OF UNIT,%ST: NORMAL
STATUS OF UNIT,%ST: NORMAL
UNIT DIVISION, %UDIV: 0
UNIT DIVISION, %UDIV: 0

## 2019-03-03 ENCOUNTER — HOSPITAL ENCOUNTER (OUTPATIENT)
Dept: INFUSION THERAPY | Age: 44
Discharge: HOME | End: 2019-03-03
Payer: COMMERCIAL

## 2019-03-03 VITALS
BODY MASS INDEX: 28.43 KG/M2 | RESPIRATION RATE: 18 BRPM | TEMPERATURE: 97.5 F | OXYGEN SATURATION: 99 % | HEART RATE: 79 BPM | WEIGHT: 165.6 LBS | DIASTOLIC BLOOD PRESSURE: 63 MMHG | SYSTOLIC BLOOD PRESSURE: 109 MMHG

## 2019-03-03 DIAGNOSIS — G04.91 MYELITIS (HCC): Primary | ICD-10-CM

## 2019-03-03 LAB
ALBUMIN SERPL-MCNC: 3.5 G/DL (ref 3.5–5)
ALBUMIN/GLOB SERPL: 1.7 {RATIO} (ref 1.2–3.5)
ALP SERPL-CCNC: 38 U/L (ref 50–136)
ALT SERPL-CCNC: 13 U/L (ref 12–65)
ANION GAP SERPL CALC-SCNC: 5 MMOL/L (ref 7–16)
APTT PPP: 27 SEC (ref 24.7–39.8)
AST SERPL-CCNC: 8 U/L (ref 15–37)
BASOPHILS # BLD: 0.1 K/UL (ref 0–0.2)
BASOPHILS NFR BLD: 1 % (ref 0–2)
BILIRUB SERPL-MCNC: 0.8 MG/DL (ref 0.2–1.1)
BUN SERPL-MCNC: 10 MG/DL (ref 6–23)
CALCIUM SERPL-MCNC: 8.2 MG/DL (ref 8.3–10.4)
CHLORIDE SERPL-SCNC: 108 MMOL/L (ref 98–107)
CO2 SERPL-SCNC: 29 MMOL/L (ref 21–32)
CREAT SERPL-MCNC: 0.57 MG/DL (ref 0.6–1)
DIFFERENTIAL METHOD BLD: ABNORMAL
EOSINOPHIL # BLD: 0.4 K/UL (ref 0–0.8)
EOSINOPHIL NFR BLD: 4 % (ref 0.5–7.8)
ERYTHROCYTE [DISTWIDTH] IN BLOOD BY AUTOMATED COUNT: 13.3 % (ref 11.9–14.6)
FIBRINOGEN PPP-MCNC: 241 MG/DL (ref 190–501)
GLOBULIN SER CALC-MCNC: 2.1 G/DL (ref 2.3–3.5)
GLUCOSE SERPL-MCNC: 92 MG/DL (ref 65–100)
HCT VFR BLD AUTO: 41 % (ref 35.8–46.3)
HGB BLD-MCNC: 13.5 G/DL (ref 11.7–15.4)
IMM GRANULOCYTES # BLD AUTO: 1 K/UL (ref 0–0.5)
IMM GRANULOCYTES NFR BLD AUTO: 9 % (ref 0–5)
LYMPHOCYTES # BLD: 2.4 K/UL (ref 0.5–4.6)
LYMPHOCYTES NFR BLD: 22 % (ref 13–44)
MAGNESIUM SERPL-MCNC: 2.3 MG/DL (ref 1.8–2.4)
MCH RBC QN AUTO: 30.8 PG (ref 26.1–32.9)
MCHC RBC AUTO-ENTMCNC: 32.9 G/DL (ref 31.4–35)
MCV RBC AUTO: 93.6 FL (ref 79.6–97.8)
MONOCYTES # BLD: 1.1 K/UL (ref 0.1–1.3)
MONOCYTES NFR BLD: 10 % (ref 4–12)
NEUTS SEG # BLD: 6.1 K/UL (ref 1.7–8.2)
NEUTS SEG NFR BLD: 54 % (ref 43–78)
NRBC # BLD: 0 K/UL (ref 0–0.2)
PLATELET # BLD AUTO: 232 K/UL (ref 150–450)
PLATELET COMMENTS,PCOM: ADEQUATE
PMV BLD AUTO: 10.4 FL (ref 9.4–12.3)
POTASSIUM SERPL-SCNC: 3.9 MMOL/L (ref 3.5–5.1)
PROT SERPL-MCNC: 5.6 G/DL (ref 6.3–8.2)
RBC # BLD AUTO: 4.38 M/UL (ref 4.05–5.2)
RBC MORPH BLD: ABNORMAL
SODIUM SERPL-SCNC: 142 MMOL/L (ref 136–145)
WBC # BLD AUTO: 11.1 K/UL (ref 4.3–11.1)

## 2019-03-03 PROCEDURE — 74011250636 HC RX REV CODE- 250/636: Performed by: INTERNAL MEDICINE

## 2019-03-03 PROCEDURE — 83735 ASSAY OF MAGNESIUM: CPT

## 2019-03-03 PROCEDURE — 36514 APHERESIS PLASMA: CPT

## 2019-03-03 PROCEDURE — 85730 THROMBOPLASTIN TIME PARTIAL: CPT

## 2019-03-03 PROCEDURE — 85384 FIBRINOGEN ACTIVITY: CPT

## 2019-03-03 PROCEDURE — 85025 COMPLETE CBC W/AUTO DIFF WBC: CPT

## 2019-03-03 PROCEDURE — 80053 COMPREHEN METABOLIC PANEL: CPT

## 2019-03-03 PROCEDURE — P9045 ALBUMIN (HUMAN), 5%, 250 ML: HCPCS | Performed by: INTERNAL MEDICINE

## 2019-03-03 RX ORDER — ALBUMIN HUMAN 50 G/1000ML
150 SOLUTION INTRAVENOUS ONCE
Status: COMPLETED | OUTPATIENT
Start: 2019-03-03 | End: 2019-03-03

## 2019-03-03 RX ORDER — SODIUM CHLORIDE 9 MG/ML
25 INJECTION, SOLUTION INTRAVENOUS CONTINUOUS
Status: DISPENSED | OUTPATIENT
Start: 2019-03-03 | End: 2019-03-03

## 2019-03-03 RX ORDER — SODIUM CHLORIDE 0.9 % (FLUSH) 0.9 %
10 SYRINGE (ML) INJECTION AS NEEDED
Status: ACTIVE | OUTPATIENT
Start: 2019-03-03 | End: 2019-03-03

## 2019-03-03 RX ADMIN — Medication 10 ML: at 11:45

## 2019-03-03 RX ADMIN — ALBUMIN (HUMAN) 150 G: 12.5 INJECTION, SOLUTION INTRAVENOUS at 11:45

## 2019-03-03 RX ADMIN — Medication 10 ML: at 13:00

## 2019-03-03 RX ADMIN — SODIUM CHLORIDE 25 ML/HR: 900 INJECTION, SOLUTION INTRAVENOUS at 11:45

## 2019-03-03 RX ADMIN — CALCIUM GLUCONATE 2 G: 98 INJECTION, SOLUTION INTRAVENOUS at 11:45

## 2019-03-03 NOTE — PROGRESS NOTES
Procedure: TPE  Dx: Myelitis  Day: 4 of 5  Labs drawn: cbc, cmp, mag, ptt, fibrinogen  Fibrinogen: 241  Calcium used:  2 grams    Replacement product: albumin   Replace volume: 2731  /  Remove volume: 2966  TBV processed:  100%  Other parameters: TPV 1.2  Issues: none  Next procedure date: 3/5/19  Labs needed: cbc, cmp, mag, ptt, fibrinogen    Patient arrived ambulatory to infusion center. Labs drawn via apheresis catheter. TPE completed and tolerated well by patient. Apheresis catheter dressing and end caps changed per protocol. Patient aware of next scheduled appointment at infusion center on 3/5/19 at 8:30am. Discharged ambulatory.

## 2019-03-05 ENCOUNTER — HOSPITAL ENCOUNTER (OUTPATIENT)
Dept: INFUSION THERAPY | Age: 44
Discharge: HOME | End: 2019-03-05
Payer: COMMERCIAL

## 2019-03-05 VITALS
BODY MASS INDEX: 27.98 KG/M2 | SYSTOLIC BLOOD PRESSURE: 98 MMHG | WEIGHT: 163 LBS | OXYGEN SATURATION: 99 % | RESPIRATION RATE: 16 BRPM | HEART RATE: 76 BPM | TEMPERATURE: 97.7 F | DIASTOLIC BLOOD PRESSURE: 62 MMHG

## 2019-03-05 DIAGNOSIS — G04.91 MYELITIS (HCC): Primary | ICD-10-CM

## 2019-03-05 LAB
ALBUMIN SERPL-MCNC: 4.2 G/DL (ref 3.5–5)
ALBUMIN/GLOB SERPL: 2.2 {RATIO} (ref 1.2–3.5)
ALP SERPL-CCNC: 35 U/L (ref 50–136)
ALT SERPL-CCNC: 32 U/L (ref 12–65)
ANION GAP SERPL CALC-SCNC: 3 MMOL/L (ref 7–16)
APTT PPP: 28.3 SEC (ref 24.7–39.8)
AST SERPL-CCNC: 19 U/L (ref 15–37)
BASOPHILS # BLD: 0 K/UL (ref 0–0.2)
BASOPHILS NFR BLD: 0 % (ref 0–2)
BILIRUB SERPL-MCNC: 1 MG/DL (ref 0.2–1.1)
BUN SERPL-MCNC: 15 MG/DL (ref 6–23)
CALCIUM SERPL-MCNC: 8.5 MG/DL (ref 8.3–10.4)
CHLORIDE SERPL-SCNC: 108 MMOL/L (ref 98–107)
CO2 SERPL-SCNC: 30 MMOL/L (ref 21–32)
CREAT SERPL-MCNC: 0.56 MG/DL (ref 0.6–1)
DIFFERENTIAL METHOD BLD: NORMAL
EOSINOPHIL # BLD: 0.3 K/UL (ref 0–0.8)
EOSINOPHIL NFR BLD: 3 % (ref 0.5–7.8)
ERYTHROCYTE [DISTWIDTH] IN BLOOD BY AUTOMATED COUNT: 13.6 % (ref 11.9–14.6)
FIBRINOGEN PPP-MCNC: 223 MG/DL (ref 190–501)
GLOBULIN SER CALC-MCNC: 1.9 G/DL (ref 2.3–3.5)
GLUCOSE SERPL-MCNC: 107 MG/DL (ref 65–100)
HCT VFR BLD AUTO: 39.3 % (ref 35.8–46.3)
HGB BLD-MCNC: 13 G/DL (ref 11.7–15.4)
IMM GRANULOCYTES # BLD AUTO: 0.5 K/UL (ref 0–0.5)
IMM GRANULOCYTES NFR BLD AUTO: 5 % (ref 0–5)
LYMPHOCYTES # BLD: 2.3 K/UL (ref 0.5–4.6)
LYMPHOCYTES NFR BLD: 21 % (ref 13–44)
MAGNESIUM SERPL-MCNC: 2.3 MG/DL (ref 1.8–2.4)
MCH RBC QN AUTO: 31 PG (ref 26.1–32.9)
MCHC RBC AUTO-ENTMCNC: 33.1 G/DL (ref 31.4–35)
MCV RBC AUTO: 93.6 FL (ref 79.6–97.8)
MONOCYTES # BLD: 1.1 K/UL (ref 0.1–1.3)
MONOCYTES NFR BLD: 10 % (ref 4–12)
NEUTS SEG # BLD: 6.6 K/UL (ref 1.7–8.2)
NEUTS SEG NFR BLD: 61 % (ref 43–78)
NRBC # BLD: 0 K/UL (ref 0–0.2)
PLATELET # BLD AUTO: 234 K/UL (ref 150–450)
PLATELET COMMENTS,PCOM: ADEQUATE
PMV BLD AUTO: 10 FL (ref 9.4–12.3)
POTASSIUM SERPL-SCNC: 4 MMOL/L (ref 3.5–5.1)
PROT SERPL-MCNC: 6.1 G/DL (ref 6.3–8.2)
RBC # BLD AUTO: 4.2 M/UL (ref 4.05–5.25)
RBC MORPH BLD: NORMAL
RBC MORPH BLD: NORMAL
SODIUM SERPL-SCNC: 141 MMOL/L (ref 136–145)
WBC # BLD AUTO: 10.8 K/UL (ref 4.3–11.1)
WBC MORPH BLD: NORMAL

## 2019-03-05 PROCEDURE — 80053 COMPREHEN METABOLIC PANEL: CPT

## 2019-03-05 PROCEDURE — 83735 ASSAY OF MAGNESIUM: CPT

## 2019-03-05 PROCEDURE — 36514 APHERESIS PLASMA: CPT

## 2019-03-05 PROCEDURE — 85730 THROMBOPLASTIN TIME PARTIAL: CPT

## 2019-03-05 PROCEDURE — P9045 ALBUMIN (HUMAN), 5%, 250 ML: HCPCS | Performed by: INTERNAL MEDICINE

## 2019-03-05 PROCEDURE — 74011250636 HC RX REV CODE- 250/636: Performed by: INTERNAL MEDICINE

## 2019-03-05 PROCEDURE — 85384 FIBRINOGEN ACTIVITY: CPT

## 2019-03-05 PROCEDURE — 85025 COMPLETE CBC W/AUTO DIFF WBC: CPT

## 2019-03-05 RX ORDER — SODIUM CHLORIDE 9 MG/ML
25 INJECTION, SOLUTION INTRAVENOUS CONTINUOUS
Status: ACTIVE | OUTPATIENT
Start: 2019-03-05 | End: 2019-03-05

## 2019-03-05 RX ORDER — ALBUMIN HUMAN 50 G/1000ML
2500 SOLUTION INTRAVENOUS ONCE
Status: COMPLETED | OUTPATIENT
Start: 2019-03-05 | End: 2019-03-05

## 2019-03-05 RX ORDER — SODIUM CHLORIDE 0.9 % (FLUSH) 0.9 %
10 SYRINGE (ML) INJECTION AS NEEDED
Status: ACTIVE | OUTPATIENT
Start: 2019-03-05 | End: 2019-03-05

## 2019-03-05 RX ADMIN — ALBUMIN (HUMAN) 125 G: 12.5 INJECTION, SOLUTION INTRAVENOUS at 10:23

## 2019-03-05 RX ADMIN — Medication 10 ML: at 09:04

## 2019-03-05 RX ADMIN — Medication 10 ML: at 11:35

## 2019-03-05 RX ADMIN — SODIUM CHLORIDE 25 ML/HR: 900 INJECTION, SOLUTION INTRAVENOUS at 10:23

## 2019-03-05 RX ADMIN — CALCIUM GLUCONATE 2 G: 98 INJECTION, SOLUTION INTRAVENOUS at 10:23

## 2019-03-05 NOTE — PROGRESS NOTES
Procedure: TPE  Dx: Myelitis  Day: 5 of 5  Labs drawn: CBC,CMP,Mag,Ptt,Fibrinogen  Fibrinogen: 223  Calcium used:  2 grams   Replacement product: Albumin  Replace volume: 2571  /  Remove volume: 2770  TBV processed:  100%  Other parameters: TPV 1.2  Issues: None    Pt ambulatory to area without complaints. Procedure tolerated well. Aware of next appt on [email or web address] to have apheresis line removed. Advised to call dr with any issues/concerns. Discharged home without complaints.

## 2019-03-06 ENCOUNTER — HOSPITAL ENCOUNTER (OUTPATIENT)
Dept: INFUSION THERAPY | Age: 44
Discharge: HOME | End: 2019-03-06
Payer: COMMERCIAL

## 2019-03-06 VITALS
OXYGEN SATURATION: 100 % | BODY MASS INDEX: 27.94 KG/M2 | WEIGHT: 162.8 LBS | SYSTOLIC BLOOD PRESSURE: 104 MMHG | DIASTOLIC BLOOD PRESSURE: 62 MMHG | HEART RATE: 81 BPM | TEMPERATURE: 97.9 F | RESPIRATION RATE: 16 BRPM

## 2019-03-06 PROCEDURE — 36589 REMOVAL TUNNELED CV CATH: CPT

## 2019-03-06 NOTE — PROGRESS NOTES
Arrived to the [facility]. Central line removal completed. Patient tolerated well. Pressure dressing applied. Patient instructed to leave on for 24 hours and to call for any obvious bleeding noted to dressing. Patient verbalized understanding. Any issues or concerns during appointment: none. No future appointments scheduled with infusion. Discharged ambulatory.

## 2019-03-11 ENCOUNTER — HOSPITAL ENCOUNTER (OUTPATIENT)
Dept: MRI IMAGING | Age: 44
Discharge: HOME | End: 2019-03-11
Attending: PSYCHIATRY & NEUROLOGY
Payer: COMMERCIAL

## 2019-03-11 DIAGNOSIS — G04.91 MYELITIS (HCC): ICD-10-CM

## 2019-03-11 PROCEDURE — 74011250636 HC RX REV CODE- 250/636: Performed by: PSYCHIATRY & NEUROLOGY

## 2019-03-11 PROCEDURE — A9575 INJ GADOTERATE MEGLUMI 0.1ML: HCPCS | Performed by: PSYCHIATRY & NEUROLOGY

## 2019-03-11 PROCEDURE — 72156 MRI NECK SPINE W/O & W/DYE: CPT

## 2019-03-11 RX ORDER — GADOTERATE MEGLUMINE 376.9 MG/ML
15 INJECTION INTRAVENOUS
Status: COMPLETED | OUTPATIENT
Start: 2019-03-11 | End: 2019-03-11

## 2019-03-11 RX ORDER — SODIUM CHLORIDE 0.9 % (FLUSH) 0.9 %
10 SYRINGE (ML) INJECTION
Status: COMPLETED | OUTPATIENT
Start: 2019-03-11 | End: 2019-03-11

## 2019-03-11 RX ADMIN — GADOTERATE MEGLUMINE 15 ML: 376.9 INJECTION INTRAVENOUS at 20:00

## 2019-03-11 RX ADMIN — Medication 10 ML: at 20:00

## 2019-03-25 PROBLEM — G37.9 CNS DEMYELINATION (HCC): Status: ACTIVE | Noted: 2019-03-25

## 2019-06-15 ENCOUNTER — HOSPITAL ENCOUNTER (OUTPATIENT)
Dept: MAMMOGRAPHY | Age: 44
Discharge: HOME | End: 2019-06-15
Attending: FAMILY MEDICINE
Payer: COMMERCIAL

## 2019-06-15 DIAGNOSIS — Z12.31 VISIT FOR SCREENING MAMMOGRAM: ICD-10-CM

## 2019-06-15 PROCEDURE — 77063 BREAST TOMOSYNTHESIS BI: CPT

## 2019-06-25 ENCOUNTER — HOSPITAL ENCOUNTER (OUTPATIENT)
Dept: MRI IMAGING | Age: 44
Discharge: HOME | End: 2019-06-25
Attending: PSYCHIATRY & NEUROLOGY
Payer: COMMERCIAL

## 2019-06-25 DIAGNOSIS — G37.9 CNS DEMYELINATION (HCC): ICD-10-CM

## 2019-06-25 PROCEDURE — 74011250636 HC RX REV CODE- 250/636: Performed by: PSYCHIATRY & NEUROLOGY

## 2019-06-25 PROCEDURE — A9575 INJ GADOTERATE MEGLUMI 0.1ML: HCPCS | Performed by: PSYCHIATRY & NEUROLOGY

## 2019-06-25 PROCEDURE — 70553 MRI BRAIN STEM W/O & W/DYE: CPT

## 2019-06-25 RX ORDER — SODIUM CHLORIDE 0.9 % (FLUSH) 0.9 %
10 SYRINGE (ML) INJECTION
Status: COMPLETED | OUTPATIENT
Start: 2019-06-25 | End: 2019-06-25

## 2019-06-25 RX ORDER — SODIUM CHLORIDE 0.9 % (FLUSH) 0.9 %
10 SYRINGE (ML) INJECTION
Status: DISCONTINUED | OUTPATIENT
Start: 2019-06-25 | End: 2019-06-26 | Stop reason: HOSPADM

## 2019-06-25 RX ORDER — GADOTERATE MEGLUMINE 376.9 MG/ML
15 INJECTION INTRAVENOUS
Status: COMPLETED | OUTPATIENT
Start: 2019-06-25 | End: 2019-06-25

## 2019-06-25 RX ADMIN — Medication 10 ML: at 18:50

## 2019-06-25 RX ADMIN — GADOTERATE MEGLUMINE 15 ML: 376.9 INJECTION INTRAVENOUS at 18:50

## 2020-07-17 ENCOUNTER — HOSPITAL ENCOUNTER (OUTPATIENT)
Dept: MAMMOGRAPHY | Age: 45
Discharge: HOME | End: 2020-07-17
Payer: COMMERCIAL

## 2020-07-17 DIAGNOSIS — Z12.31 VISIT FOR SCREENING MAMMOGRAM: ICD-10-CM

## 2020-07-17 PROCEDURE — 77063 BREAST TOMOSYNTHESIS BI: CPT

## 2021-10-14 ENCOUNTER — TRANSCRIBE ORDER (OUTPATIENT)
Dept: SCHEDULING | Age: 46
End: 2021-10-14

## 2021-10-14 DIAGNOSIS — Z12.31 SCREENING MAMMOGRAM FOR HIGH-RISK PATIENT: Primary | ICD-10-CM

## 2021-10-15 ENCOUNTER — HOSPITAL ENCOUNTER (OUTPATIENT)
Dept: MAMMOGRAPHY | Age: 46
Discharge: HOME | End: 2021-10-15
Attending: OBSTETRICS & GYNECOLOGY
Payer: COMMERCIAL

## 2021-10-15 DIAGNOSIS — Z12.31 SCREENING MAMMOGRAM FOR HIGH-RISK PATIENT: ICD-10-CM

## 2021-10-15 PROCEDURE — 77067 SCR MAMMO BI INCL CAD: CPT

## 2022-03-18 PROBLEM — M79.605 PAIN IN BOTH LOWER EXTREMITIES: Status: ACTIVE | Noted: 2019-01-24

## 2022-03-18 PROBLEM — M79.604 PAIN IN BOTH LOWER EXTREMITIES: Status: ACTIVE | Noted: 2019-01-24

## 2022-03-18 PROBLEM — Z80.8 FAMILY HISTORY OF THYROID CANCER: Status: ACTIVE | Noted: 2018-06-18

## 2022-03-19 PROBLEM — Z86.19 HISTORY OF SHINGLES: Status: ACTIVE | Noted: 2019-01-29

## 2022-03-19 PROBLEM — R20.2 PARESTHESIA OF BOTH LEGS: Status: ACTIVE | Noted: 2019-01-24

## 2022-03-19 PROBLEM — G37.9 CNS DEMYELINATION (HCC): Status: ACTIVE | Noted: 2019-03-25

## 2022-03-19 PROBLEM — R29.2 HYPERREFLEXIA: Status: ACTIVE | Noted: 2019-01-24

## 2022-03-19 PROBLEM — B02.24: Status: ACTIVE | Noted: 2019-01-26

## 2022-03-19 PROBLEM — G04.91 MYELITIS (HCC): Status: ACTIVE | Noted: 2019-02-23

## 2022-03-20 PROBLEM — E04.9 GOITER: Status: ACTIVE | Noted: 2018-06-18

## 2022-07-20 ENCOUNTER — OFFICE VISIT (OUTPATIENT)
Dept: ENDOCRINOLOGY | Age: 47
End: 2022-07-20
Payer: COMMERCIAL

## 2022-07-20 VITALS
HEART RATE: 77 BPM | OXYGEN SATURATION: 98 % | DIASTOLIC BLOOD PRESSURE: 78 MMHG | SYSTOLIC BLOOD PRESSURE: 118 MMHG | WEIGHT: 187 LBS

## 2022-07-20 DIAGNOSIS — Z80.8 FAMILY HISTORY OF THYROID CANCER: ICD-10-CM

## 2022-07-20 DIAGNOSIS — E06.3 HASHIMOTO'S THYROIDITIS: Primary | ICD-10-CM

## 2022-07-20 LAB — TSH W FREE THYROID IF ABNORMAL: 2.25 UIU/ML (ref 0.36–3.74)

## 2022-07-20 PROCEDURE — 99213 OFFICE O/P EST LOW 20 MIN: CPT | Performed by: INTERNAL MEDICINE

## 2022-07-20 PROCEDURE — 76536 US EXAM OF HEAD AND NECK: CPT | Performed by: INTERNAL MEDICINE

## 2022-07-20 NOTE — PROGRESS NOTES
Farhad Rodas MD, [address] [place] [address]            Reason for visit: Follow-up of Hashimoto's thyroiditis      ASSESSMENT AND PLAN:    1. Hashimoto's thyroiditis  She has Hashimoto's thyroiditis with historically normal thyroid function. I will reassess today. Because of her family history of thyroid cancer, ultrasound was repeated today. It is consistent with her known history of Hashimoto's thyroiditis. No nodules are noted. Repeat in 2 years.  - TSH with Reflex; Future  - TSH with Reflex    2. Family history of thyroid cancer  - US,HEAD/NECK TISSUES,REAL TIME        PROCEDURES:    HEAD AND NECK ULTRASOUND    Date of study:  7/20/2022    Performing/interpreting physician:  Farhad Rodas MD, FACE    Indication: Hashimoto's thyroiditis, family history of thyroid carcinoma    Technique:  Using a 12 MHz linear transducer, multiple real-time planar images were obtained of the thyroid and surrounding tissues. Findings:  Right lobe 1.75 x 2.15 x 5.02 cm, isthmus 0.38 cm, left lobe 1.66 x 2.26 x 5.59 cm. Heterogeneous echotexture. Normal blood flow. No nodules. No cervical lymphadenopathy. Impression: Findings consistent with autoimmune thyroid disease      Follow-up and Dispositions    Return in about 2 years (around 7/20/2024). History of Present Illness:    THYROID NODULES  Ej Li is seen today in the [place] [address] for follow-up of abnormal thyroid ultrasound. This was first noted in approximately 2005 by an endocrinologist in [place]. At that time, she had a thyroid ultrasound and nuclear medicine scan. She does not recall being told that she had thyroid nodules. Biopsy was not recommended. She took levothyroxine 25 mcg daily under the direction of her endocrinologist for a few years. Locally, she has seen Dr. Liza Cao with [facility] (most recently in 2016).   According to Dr. Dee High records, she has a history of Hashimoto's thyroiditis with antithyroid peroxidase antibody titers greater than 1000. There is not a history of radiation to the head/neck. The patient does have a family history of Hashimoto's thyroiditis (two sisters and a brother) and thyroid cancer (her father had papillary thyroid carcinoma). Current symptoms: See review of systems below     Prior imaging:  3/14/2012: Ultrasound ([facility])- Right lobe 4.9 x 1.7 x 2.0 cm, isthmus 0.3 cm, left lobe 5.2 x 1.7 x 1.9 cm. Heterogeneous echotexture. Increased blood flow. No nodules. 1/14/2014: Ultrasound (Dr. Derick Meier)- Enlarged, heterogeneous, hypervascular gland without nodules. 5/30/2018: Ultrasound ([address] [place] [address])- Right lobe 4.7 x 2.2 x 2.1 cm, isthmus 0.3 cm, left lobe 5.1 x 2.0 x 2.1 cm. Heterogeneous echotexture. Increased blood flow. No nodules. 7/20/2020: Ultrasound ([facility])- Right lobe 2.14 x 1.86 x 5.45 cm, isthmus 0.20 cm, left lobe 1.55 x 2.08 x 5.43 cm. Heterogeneous echotexture. Increased blood flow. No nodules. No cervical lymphadenopathy. 7/28/2022: Ultrasound ([facility])- Right lobe 1.75 x 2.15 x 5.02 cm, isthmus 0.38 cm, left lobe 1.66 x 2.26 x 5.59 cm. Heterogeneous echotexture. Normal blood flow. No nodules. No cervical lymphadenopathy. Prior laboratory evaluation:  10/25/2013: TSH 2.830, free T4 1.23, free T3 2.9.  2/24/2015: TSH 2.540, free T4 1.23, free T3 2.6.  9/16/2015: TSH 2.554.  9/20/2016: TSH 2.238.  5/1/2017: TSH 2.580.  5/21/2018: TSH 2.950, free T4 1.25.  1/22/2019: TSH 3.210, free T4 1.20.  1/26/2019: TSH 3.880, free T4 1.1. Prior biopsies:  none    Review of Systems   Constitutional:  Positive for fatigue and unexpected weight change (gained 25-30 pounds in 2 years). Cardiovascular:  Negative for palpitations.      /78 (Site: Left Upper Arm, Position: Sitting)   Pulse 77   Wt 187 lb (84.8 kg)   SpO2 98%   Wt Readings from Last 3 Encounters:   07/20/22 187 lb (84.8 kg)       Physical Exam  HENT:      Head: Normocephalic. Neck:      Thyroid: No thyroid mass or thyromegaly. Cardiovascular:      Rate and Rhythm: Normal rate. Pulmonary:      Effort: No respiratory distress. Breath sounds: Normal breath sounds. Neurological:      Mental Status: She is alert. Psychiatric:         Mood and Affect: Mood normal.         Behavior: Behavior normal.       Orders Placed This Encounter   Procedures    US,HEAD/NECK TISSUES,REAL TIME    TSH with Reflex     Standing Status:   Future     Number of Occurrences:   1     Standing Expiration Date:   7/20/2023         Current Outpatient Medications   Medication Sig Dispense Refill    Cholecalciferol 50 MCG (2000 UT) TABS Take by mouth      sertraline (ZOLOFT) 50 MG tablet Take 50 mg by mouth daily       No current facility-administered medications for this visit. Mckenna Parrish MD, FACE      Portions of this note were generated with the assistance of voice recognition software. As such, some errors in transcription may be present.

## 2022-10-04 ENCOUNTER — TRANSCRIBE ORDERS (OUTPATIENT)
Dept: SCHEDULING | Age: 47
End: 2022-10-04

## 2022-10-04 DIAGNOSIS — Z12.31 VISIT FOR SCREENING MAMMOGRAM: Primary | ICD-10-CM

## 2022-10-18 ENCOUNTER — HOSPITAL ENCOUNTER (OUTPATIENT)
Dept: MAMMOGRAPHY | Age: 47
Discharge: HOME | End: 2022-10-21
Payer: COMMERCIAL

## 2022-10-18 DIAGNOSIS — Z12.31 VISIT FOR SCREENING MAMMOGRAM: ICD-10-CM

## 2022-10-18 PROCEDURE — 77067 SCR MAMMO BI INCL CAD: CPT

## 2023-10-28 ENCOUNTER — HOSPITAL ENCOUNTER (OUTPATIENT)
Dept: MAMMOGRAPHY | Age: 48
End: 2023-10-28
Payer: COMMERCIAL

## 2023-10-28 VITALS — HEIGHT: 64 IN | BODY MASS INDEX: 32.27 KG/M2 | WEIGHT: 189 LBS

## 2023-10-28 DIAGNOSIS — Z12.31 VISIT FOR SCREENING MAMMOGRAM: ICD-10-CM

## 2023-10-28 PROCEDURE — 77063 BREAST TOMOSYNTHESIS BI: CPT

## 2024-09-16 ENCOUNTER — OFFICE VISIT (OUTPATIENT)
Dept: ENDOCRINOLOGY | Age: 49
End: 2024-09-16
Payer: COMMERCIAL

## 2024-09-16 VITALS
BODY MASS INDEX: 32.1 KG/M2 | DIASTOLIC BLOOD PRESSURE: 78 MMHG | SYSTOLIC BLOOD PRESSURE: 120 MMHG | WEIGHT: 187 LBS | HEART RATE: 78 BPM

## 2024-09-16 DIAGNOSIS — E06.3 HASHIMOTO'S THYROIDITIS: ICD-10-CM

## 2024-09-16 DIAGNOSIS — Z80.8 FAMILY HISTORY OF THYROID CANCER: ICD-10-CM

## 2024-09-16 DIAGNOSIS — E06.3 HASHIMOTO'S THYROIDITIS: Primary | ICD-10-CM

## 2024-09-16 LAB — TSH W FREE THYROID IF ABNORMAL: 2.35 UIU/ML (ref 0.27–4.2)

## 2024-09-16 PROCEDURE — 76536 US EXAM OF HEAD AND NECK: CPT | Performed by: INTERNAL MEDICINE

## 2024-09-16 PROCEDURE — 99213 OFFICE O/P EST LOW 20 MIN: CPT | Performed by: INTERNAL MEDICINE

## 2024-10-09 ENCOUNTER — TRANSCRIBE ORDERS (OUTPATIENT)
Dept: SCHEDULING | Age: 49
End: 2024-10-09

## 2024-10-09 DIAGNOSIS — Z12.31 OTHER SCREENING MAMMOGRAM: Primary | ICD-10-CM

## 2024-11-20 ENCOUNTER — HOSPITAL ENCOUNTER (OUTPATIENT)
Dept: MAMMOGRAPHY | Age: 49
Discharge: HOME | End: 2024-11-23
Payer: COMMERCIAL

## 2024-11-20 DIAGNOSIS — Z12.31 OTHER SCREENING MAMMOGRAM: ICD-10-CM

## 2024-11-20 PROCEDURE — 77063 BREAST TOMOSYNTHESIS BI: CPT

## 2025-07-16 ENCOUNTER — PATIENT MESSAGE (OUTPATIENT)
Dept: ENDOCRINOLOGY | Age: 50
End: 2025-07-16

## 2025-07-23 ENCOUNTER — OFFICE VISIT (OUTPATIENT)
Dept: ORTHOPEDIC SURGERY | Age: 50
End: 2025-07-23
Payer: COMMERCIAL

## 2025-07-23 DIAGNOSIS — S83.241A TEAR OF MEDIAL MENISCUS OF RIGHT KNEE, UNSPECIFIED TEAR TYPE, UNSPECIFIED WHETHER OLD OR CURRENT TEAR, INITIAL ENCOUNTER: Primary | ICD-10-CM

## 2025-07-23 PROCEDURE — 99204 OFFICE O/P NEW MOD 45 MIN: CPT | Performed by: PHYSICIAN ASSISTANT

## 2025-07-23 NOTE — PROGRESS NOTES
Name: Ej Pulido  YOB: 1975  Gender: female  MRN: [id number]    CC:   Chief Complaint   Patient presents with    Knee Pain     Right   Right medial KNEE PAIN; STIFFNESS     History of Present Illness  The patient came in for right knee pain. She started feeling pain in her right knee around May 2025, and it was accompanied by occasional popping and clicking sounds. She has had some of the popping chronically.  She notes an additional exacerbating event when she was walking on sand at the beach three weeks ago. The pain is mostly on the inside of her knee, but sometimes it spreads to other areas. She hasn't had any major issues with her knee catching, locking, or giving out. Activities like squatting and climbing stairs make the pain worse, so she has stopped doing squats. Sitting for a long time and then getting up to walk also triggers the pain. She hasn't noticed any swelling in her knee. Over the past two months, she feels like the condition has gotten better. Initially, the pain was mild and seemed to be improving, but it got worse after she started doing moderate exercise again, like squats and walking on the treadmill. She has been managing the pain with Tylenol, which helps a little, and by using ice and warm compresses. Has used NSAIDs as well.     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

## 2025-07-30 RX ORDER — FEXOFENADINE HCL 180 MG/1
180 TABLET ORAL DAILY
COMMUNITY
Start: 2025-02-18

## 2025-07-30 RX ORDER — PHENTERMINE HYDROCHLORIDE 37.5 MG/1
37.5 TABLET ORAL DAILY
COMMUNITY

## 2025-07-30 RX ORDER — ALPRAZOLAM 1 MG/1
TABLET ORAL
COMMUNITY
Start: 2025-07-29

## 2025-07-30 RX ORDER — IBUPROFEN 800 MG/1
800 TABLET, FILM COATED ORAL 3 TIMES DAILY
COMMUNITY
Start: 2025-02-18

## 2025-08-05 ENCOUNTER — OFFICE VISIT (OUTPATIENT)
Dept: ORTHOPEDIC SURGERY | Age: 50
End: 2025-08-05
Payer: COMMERCIAL

## 2025-08-05 DIAGNOSIS — M25.561 CHRONIC PAIN OF RIGHT KNEE: Primary | ICD-10-CM

## 2025-08-05 DIAGNOSIS — M17.11 ARTHRITIS OF RIGHT KNEE: ICD-10-CM

## 2025-08-05 DIAGNOSIS — M22.2X1 PATELLOFEMORAL PAIN SYNDROME OF RIGHT KNEE: ICD-10-CM

## 2025-08-05 DIAGNOSIS — G89.29 CHRONIC PAIN OF RIGHT KNEE: Primary | ICD-10-CM

## 2025-08-05 PROCEDURE — 99214 OFFICE O/P EST MOD 30 MIN: CPT | Performed by: ORTHOPAEDIC SURGERY
